# Patient Record
Sex: FEMALE | Race: BLACK OR AFRICAN AMERICAN | Employment: STUDENT | URBAN - METROPOLITAN AREA
[De-identification: names, ages, dates, MRNs, and addresses within clinical notes are randomized per-mention and may not be internally consistent; named-entity substitution may affect disease eponyms.]

---

## 2024-09-19 ENCOUNTER — OFFICE VISIT (OUTPATIENT)
Dept: OBGYN | Age: 29
End: 2024-09-19
Payer: COMMERCIAL

## 2024-09-19 DIAGNOSIS — Z87.59 HISTORY OF MISCARRIAGE: ICD-10-CM

## 2024-09-19 DIAGNOSIS — Z30.09 FAMILY PLANNING COUNSELING: ICD-10-CM

## 2024-09-19 DIAGNOSIS — Z01.419 WOMEN'S ANNUAL ROUTINE GYNECOLOGICAL EXAMINATION: Primary | ICD-10-CM

## 2024-09-19 PROCEDURE — 99395 PREV VISIT EST AGE 18-39: CPT

## 2024-09-19 PROCEDURE — 99459 PELVIC EXAMINATION: CPT

## 2024-09-19 RX ORDER — CALCIUM CARBONATE 300MG(750)
TABLET,CHEWABLE ORAL
Qty: 30 TABLET | Refills: 11 | Status: SHIPPED | OUTPATIENT
Start: 2024-09-19

## 2024-09-19 SDOH — ECONOMIC STABILITY: FOOD INSECURITY: WITHIN THE PAST 12 MONTHS, YOU WORRIED THAT YOUR FOOD WOULD RUN OUT BEFORE YOU GOT MONEY TO BUY MORE.: NEVER TRUE

## 2024-09-19 SDOH — ECONOMIC STABILITY: INCOME INSECURITY: HOW HARD IS IT FOR YOU TO PAY FOR THE VERY BASICS LIKE FOOD, HOUSING, MEDICAL CARE, AND HEATING?: NOT HARD AT ALL

## 2024-09-19 SDOH — ECONOMIC STABILITY: FOOD INSECURITY: WITHIN THE PAST 12 MONTHS, THE FOOD YOU BOUGHT JUST DIDN'T LAST AND YOU DIDN'T HAVE MONEY TO GET MORE.: NEVER TRUE

## 2024-09-19 ASSESSMENT — ENCOUNTER SYMPTOMS
CHEST TIGHTNESS: 0
DIARRHEA: 0
VOMITING: 0
SHORTNESS OF BREATH: 0
NAUSEA: 0
CONSTIPATION: 0
ABDOMINAL PAIN: 0
RESPIRATORY NEGATIVE: 1
GASTROINTESTINAL NEGATIVE: 1

## 2024-09-19 ASSESSMENT — PATIENT HEALTH QUESTIONNAIRE - PHQ9
1. LITTLE INTEREST OR PLEASURE IN DOING THINGS: NOT AT ALL
SUM OF ALL RESPONSES TO PHQ9 QUESTIONS 1 & 2: 0
2. FEELING DOWN, DEPRESSED OR HOPELESS: NOT AT ALL
SUM OF ALL RESPONSES TO PHQ QUESTIONS 1-9: 0

## 2024-10-31 ENCOUNTER — OFFICE VISIT (OUTPATIENT)
Dept: FAMILY MEDICINE CLINIC | Age: 29
End: 2024-10-31

## 2024-10-31 VITALS
DIASTOLIC BLOOD PRESSURE: 82 MMHG | OXYGEN SATURATION: 98 % | SYSTOLIC BLOOD PRESSURE: 122 MMHG | HEART RATE: 90 BPM | HEIGHT: 64 IN | BODY MASS INDEX: 44.39 KG/M2 | WEIGHT: 260 LBS | TEMPERATURE: 97.5 F

## 2024-10-31 DIAGNOSIS — H61.22 HEARING LOSS OF LEFT EAR DUE TO CERUMEN IMPACTION: Primary | ICD-10-CM

## 2024-10-31 NOTE — PROGRESS NOTES
10/31/2024    Marlene MILLER [facility]    Chief Complaint   Patient presents with    Hearing Loss     - diminished hearing left ear, pt states ear feels \"muffled\", denies pain. Pt states she would like both ear looked at. 3 to 4 days.        HPI  History was obtained from patient.  Marlene is a 29 y.o. female who presents today with complaints of diminished hearing from the left ear for the past 3 to 4 days.  She states it sounds muffled.  Similar issues in the past when she had earwax impaction.  She denies any ear pain, or drainage.  No fevers.        PAST MEDICAL HISTORY  Past Medical History:   Diagnosis Date    Dysmenorrhea     History of miscarriage     Irregular menses     Menorrhagia        FAMILY HISTORY  Family History   Problem Relation Age of Onset    Heart Disease Maternal Grandmother        SOCIAL HISTORY  Social History     Socioeconomic History    Marital status: Single     Spouse name: None    Number of children: None    Years of education: None    Highest education level: None   Tobacco Use    Smoking status: Never    Smokeless tobacco: Never   Vaping Use    Vaping status: Never Used   Substance and Sexual Activity    Alcohol use: Yes     Comment: occ    Drug use: Never    Sexual activity: Yes     Partners: Male     Social Determinants of Health     Financial Resource Strain: Low Risk  (9/19/2024)    Overall Financial Resource Strain (CARDIA)     Difficulty of Paying Living Expenses: Not hard at all   Food Insecurity: No Food Insecurity (9/19/2024)    Hunger Vital Sign     Worried About Running Out of Food in the Last Year: Never true     Ran Out of Food in the Last Year: Never true   Transportation Needs: Unknown (9/19/2024)    PRAPARE - Transportation     Lack of Transportation (Non-Medical): No   Housing Stability: Unknown (9/19/2024)    Housing Stability Vital Sign     Homeless in the Last Year: No        SURGICAL HISTORY  Past Surgical History:   Procedure Laterality Date    TONSILLECTOMY

## 2024-11-14 ENCOUNTER — HOSPITAL ENCOUNTER (OUTPATIENT)
Age: 29
Discharge: HOME | End: 2024-11-14
Payer: COMMERCIAL

## 2024-11-14 PROCEDURE — 84144 ASSAY OF PROGESTERONE: CPT

## 2024-11-14 PROCEDURE — 84702 CHORIONIC GONADOTROPIN TEST: CPT

## 2024-11-14 PROCEDURE — 36415 COLL VENOUS BLD VENIPUNCTURE: CPT

## 2024-11-15 LAB
B-HCG SERPL EIA 3RD IS-ACNC: 29.3 MIU/ML
PROGESTERONE LEVEL: 10.2 NG/ML

## 2024-11-16 ENCOUNTER — HOSPITAL ENCOUNTER (OUTPATIENT)
Age: 29
Discharge: HOME | End: 2024-11-16
Payer: COMMERCIAL

## 2024-11-16 LAB — B-HCG SERPL EIA 3RD IS-ACNC: 47 MIU/ML

## 2024-11-16 PROCEDURE — 84702 CHORIONIC GONADOTROPIN TEST: CPT

## 2024-11-16 PROCEDURE — 36415 COLL VENOUS BLD VENIPUNCTURE: CPT

## 2024-12-02 ENCOUNTER — HOSPITAL ENCOUNTER (OUTPATIENT)
Age: 29
Discharge: HOME | End: 2024-12-02
Payer: COMMERCIAL

## 2024-12-02 LAB — B-HCG SERPL EIA 3RD IS-ACNC: NORMAL MIU/ML

## 2024-12-02 PROCEDURE — 36415 COLL VENOUS BLD VENIPUNCTURE: CPT

## 2024-12-02 PROCEDURE — 84702 CHORIONIC GONADOTROPIN TEST: CPT

## 2024-12-31 ENCOUNTER — OFFICE VISIT (OUTPATIENT)
Dept: FAMILY MEDICINE CLINIC | Age: 29
End: 2024-12-31
Payer: COMMERCIAL

## 2024-12-31 VITALS
OXYGEN SATURATION: 96 % | RESPIRATION RATE: 18 BRPM | HEIGHT: 64 IN | WEIGHT: 259 LBS | BODY MASS INDEX: 44.22 KG/M2 | HEART RATE: 88 BPM | DIASTOLIC BLOOD PRESSURE: 70 MMHG | SYSTOLIC BLOOD PRESSURE: 108 MMHG

## 2024-12-31 DIAGNOSIS — Z3A.11 11 WEEKS GESTATION OF PREGNANCY: ICD-10-CM

## 2024-12-31 DIAGNOSIS — J06.9 UPPER RESPIRATORY INFECTION WITH COUGH AND CONGESTION: Primary | ICD-10-CM

## 2024-12-31 PROCEDURE — 99213 OFFICE O/P EST LOW 20 MIN: CPT

## 2024-12-31 RX ORDER — AZITHROMYCIN 250 MG/1
TABLET, FILM COATED ORAL
Qty: 6 TABLET | Refills: 0 | Status: SHIPPED | OUTPATIENT
Start: 2024-12-31 | End: 2025-01-10

## 2024-12-31 RX ORDER — PROGESTERONE 200 MG/1
CAPSULE ORAL
COMMUNITY
Start: 2024-12-19

## 2024-12-31 RX ORDER — PREDNISONE 20 MG/1
20 TABLET ORAL 2 TIMES DAILY WITH MEALS
Qty: 10 TABLET | Refills: 0 | Status: SHIPPED | OUTPATIENT
Start: 2024-12-31 | End: 2025-01-05

## 2024-12-31 ASSESSMENT — ENCOUNTER SYMPTOMS
GASTROINTESTINAL NEGATIVE: 1
SHORTNESS OF BREATH: 1
COUGH: 1

## 2024-12-31 NOTE — PROGRESS NOTES
Wt 117.5 kg (259 lb)   SpO2 96%   BMI 44.46 kg/m²     Physical Exam  Vitals and nursing note reviewed.   Constitutional:       General: She is not in acute distress.     Appearance: She is ill-appearing. She is not toxic-appearing or diaphoretic.   HENT:      Head: Normocephalic and atraumatic.   Cardiovascular:      Rate and Rhythm: Normal rate and regular rhythm.      Heart sounds: Normal heart sounds.   Pulmonary:      Effort: Pulmonary effort is normal.      Breath sounds: Normal air entry. Examination of the right-upper field reveals rales. Examination of the left-upper field reveals rales. Examination of the left-lower field reveals rales. Rales present.   Neurological:      Mental Status: She is alert.         ASSESSMENT & PLAN  1. Upper respiratory infection with cough and congestion  Given symptoms lasting for greater than 10 days and pregnancy will treat with 5 day steroid burst in addition to Azithromycin.   - predniSONE (DELTASONE) 20 MG tablet; Take 1 tablet by mouth with breakfast and with evening meal for 5 days With breakfast and lunch  Dispense: 10 tablet; Refill: 0  - azithromycin (ZITHROMAX) 250 MG tablet; 500mg on day 1 followed by 250mg on days 2 - 5  Dispense: 6 tablet; Refill: 0    2. 11 weeks gestation of pregnancy  Following with OB routinely. Reviewed medication safety for prednisone and azithromycin. Risk of prednisone lower due to short timeline.       Return if symptoms worsen or fail to improve.          Plan of care reviewed with patient as described above, all questions answered at this time. Patient agreeable with plan of care.       Electronically signed by TOOTIE Reagan CNP on 12/31/2024

## 2025-02-07 DIAGNOSIS — R23.3 PETECHIAE: Primary | ICD-10-CM

## 2025-03-08 ENCOUNTER — HOSPITAL ENCOUNTER (OUTPATIENT)
Age: 30
Discharge: HOME | End: 2025-03-08
Payer: COMMERCIAL

## 2025-03-08 DIAGNOSIS — R23.3 PETECHIAE: ICD-10-CM

## 2025-03-08 LAB
ABO + RH BLD: NORMAL
BASOPHILS # BLD: 0.03 K/UL
BASOPHILS NFR BLD: 0 % (ref 0–1)
BLOOD BANK COMMENT: NORMAL
BLOOD BANK SAMPLE EXPIRATION: NORMAL
BLOOD GROUP ANTIBODIES SERPL: NEGATIVE
EOSINOPHIL # BLD: 0.18 K/UL
EOSINOPHILS RELATIVE PERCENT: 2 % (ref 0–3)
ERYTHROCYTE [DISTWIDTH] IN BLOOD BY AUTOMATED COUNT: 15.6 % (ref 11.7–14.9)
ERYTHROCYTE [DISTWIDTH] IN BLOOD BY AUTOMATED COUNT: 15.6 % (ref 11.7–14.9)
HBV CORE AB SER QL: NONREACTIVE
HCT VFR BLD AUTO: 35.8 % (ref 37–47)
HCT VFR BLD AUTO: 36.6 % (ref 37–47)
HGB BLD-MCNC: 11.6 G/DL (ref 12.5–16)
HGB BLD-MCNC: 11.8 G/DL (ref 12.5–16)
HIV 1+2 AB+HIV1 P24 AG SERPL QL IA: NONREACTIVE
IMM GRANULOCYTES # BLD AUTO: 0.15 K/UL
IMM GRANULOCYTES NFR BLD: 2 %
INR PPP: 1
LYMPHOCYTES NFR BLD: 2.26 K/UL
LYMPHOCYTES RELATIVE PERCENT: 24 % (ref 24–44)
MCH RBC QN AUTO: 27.8 PG (ref 27–31)
MCH RBC QN AUTO: 27.9 PG (ref 27–31)
MCHC RBC AUTO-ENTMCNC: 32.2 G/DL (ref 32–36)
MCHC RBC AUTO-ENTMCNC: 32.4 G/DL (ref 32–36)
MCV RBC AUTO: 86.1 FL (ref 78–100)
MCV RBC AUTO: 86.3 FL (ref 78–100)
MONOCYTES NFR BLD: 0.59 K/UL
MONOCYTES NFR BLD: 6 % (ref 0–4)
NEUTROPHILS NFR BLD: 66 % (ref 36–66)
NEUTS SEG NFR BLD: 6.36 K/UL
PARTIAL THROMBOPLASTIN TIME: 26.9 SEC (ref 25.1–37.1)
PLATELET # BLD AUTO: 172 K/UL (ref 140–440)
PLATELET # BLD AUTO: 176 K/UL (ref 140–440)
PMV BLD AUTO: 12 FL (ref 7.5–11.1)
PMV BLD AUTO: 12 FL (ref 7.5–11.1)
PROTHROMBIN TIME: 13 SEC (ref 11.7–14.5)
RBC # BLD AUTO: 4.16 M/UL (ref 4.2–5.4)
RBC # BLD AUTO: 4.24 M/UL (ref 4.2–5.4)
T PALLIDUM AB SER QL IA: NONREACTIVE
WBC OTHER # BLD: 9.6 K/UL (ref 4–10.5)
WBC OTHER # BLD: 9.6 K/UL (ref 4–10.5)

## 2025-03-08 PROCEDURE — 85025 COMPLETE CBC W/AUTO DIFF WBC: CPT

## 2025-03-08 PROCEDURE — 36415 COLL VENOUS BLD VENIPUNCTURE: CPT

## 2025-03-08 PROCEDURE — 87389 HIV-1 AG W/HIV-1&-2 AB AG IA: CPT

## 2025-03-08 PROCEDURE — 86787 VARICELLA-ZOSTER ANTIBODY: CPT

## 2025-03-08 PROCEDURE — 86850 RBC ANTIBODY SCREEN: CPT

## 2025-03-08 PROCEDURE — 86780 TREPONEMA PALLIDUM: CPT

## 2025-03-08 PROCEDURE — 86900 BLOOD TYPING SEROLOGIC ABO: CPT

## 2025-03-08 PROCEDURE — 86704 HEP B CORE ANTIBODY TOTAL: CPT

## 2025-03-08 PROCEDURE — 86803 HEPATITIS C AB TEST: CPT

## 2025-03-08 PROCEDURE — 85610 PROTHROMBIN TIME: CPT

## 2025-03-08 PROCEDURE — 85730 THROMBOPLASTIN TIME PARTIAL: CPT

## 2025-03-08 PROCEDURE — 85027 COMPLETE CBC AUTOMATED: CPT

## 2025-03-08 PROCEDURE — 86901 BLOOD TYPING SEROLOGIC RH(D): CPT

## 2025-03-08 PROCEDURE — 86762 RUBELLA ANTIBODY: CPT

## 2025-03-10 ENCOUNTER — RESULTS FOLLOW-UP (OUTPATIENT)
Dept: FAMILY MEDICINE CLINIC | Age: 30
End: 2025-03-10

## 2025-03-12 LAB
RUBELLA AB (IGG): 409 IU/ML
VARICELLA-ZOSTER (IMMUNE STATUS) IGG: NORMAL

## 2025-04-19 ENCOUNTER — HOSPITAL ENCOUNTER (OUTPATIENT)
Age: 30
Discharge: HOME | End: 2025-04-19
Payer: COMMERCIAL

## 2025-04-19 LAB
AMOUNT GLUCOSE GIVEN: NORMAL G
ERYTHROCYTE [DISTWIDTH] IN BLOOD BY AUTOMATED COUNT: 16 % (ref 11.7–14.9)
GLUCOSE 3H P 100 G GLC PO SERPL-MCNC: 149 MG/DL
HCT VFR BLD AUTO: 35.7 % (ref 37–47)
HGB BLD-MCNC: 11.5 G/DL (ref 12.5–16)
MCH RBC QN AUTO: 28.3 PG (ref 27–31)
MCHC RBC AUTO-ENTMCNC: 32.2 G/DL (ref 32–36)
MCV RBC AUTO: 87.9 FL (ref 78–100)
PLATELET # BLD AUTO: 164 K/UL (ref 140–440)
PMV BLD AUTO: 11.8 FL (ref 7.5–11.1)
RBC # BLD AUTO: 4.06 M/UL (ref 4.2–5.4)
WBC OTHER # BLD: 7.5 K/UL (ref 4–10.5)

## 2025-04-19 PROCEDURE — 82950 GLUCOSE TEST: CPT

## 2025-04-19 PROCEDURE — 36415 COLL VENOUS BLD VENIPUNCTURE: CPT

## 2025-04-19 PROCEDURE — 85027 COMPLETE CBC AUTOMATED: CPT

## 2025-04-19 PROCEDURE — 87517 HEPATITIS B DNA QUANT: CPT

## 2025-04-22 LAB
HBV IU/ML: NOT DETECTED IU/ML
HBV LOG 10 IU/ML: NOT DETECTED LOG IU/ML
INTERPRETATION: NOT DETECTED

## 2025-05-02 ENCOUNTER — HOSPITAL ENCOUNTER (OUTPATIENT)
Age: 30
Discharge: HOME | End: 2025-05-02
Payer: COMMERCIAL

## 2025-05-02 LAB
AMOUNT GLUCOSE GIVEN: NORMAL G
GLUCOSE 2H P 100 G GLC PO SERPL-MCNC: 90 MG/DL (ref 70–99)
GLUCOSE 3H P 100 G GLC PO SERPL-MCNC: 104 MG/DL
GLUCOSE TOLERANCE TEST 2 HOUR: 138 MG/DL
GLUCOSE TOLERANCE TEST 3 HOUR: 154 MG/DL

## 2025-05-02 PROCEDURE — 82951 GLUCOSE TOLERANCE TEST (GTT): CPT

## 2025-05-02 PROCEDURE — 36415 COLL VENOUS BLD VENIPUNCTURE: CPT

## 2025-05-02 PROCEDURE — 82952 GTT-ADDED SAMPLES: CPT

## 2025-06-03 ENCOUNTER — INITIAL PRENATAL (OUTPATIENT)
Dept: OBGYN | Age: 30
End: 2025-06-03

## 2025-06-03 VITALS
WEIGHT: 266 LBS | DIASTOLIC BLOOD PRESSURE: 69 MMHG | SYSTOLIC BLOOD PRESSURE: 116 MMHG | BODY MASS INDEX: 45.66 KG/M2 | HEART RATE: 109 BPM

## 2025-06-03 DIAGNOSIS — E66.01 MORBID OBESITY (HCC): ICD-10-CM

## 2025-06-03 DIAGNOSIS — O09.93 SUPERVISION OF HIGH RISK PREGNANCY IN THIRD TRIMESTER: Primary | ICD-10-CM

## 2025-06-03 DIAGNOSIS — Z3A.32 32 WEEKS GESTATION OF PREGNANCY: ICD-10-CM

## 2025-06-03 PROCEDURE — 0502F SUBSEQUENT PRENATAL CARE: CPT | Performed by: NURSE PRACTITIONER

## 2025-06-03 RX ORDER — ASPIRIN 81 MG/1
81 TABLET ORAL DAILY
COMMUNITY

## 2025-06-03 SDOH — ECONOMIC STABILITY: FOOD INSECURITY: WITHIN THE PAST 12 MONTHS, THE FOOD YOU BOUGHT JUST DIDN'T LAST AND YOU DIDN'T HAVE MONEY TO GET MORE.: NEVER TRUE

## 2025-06-03 SDOH — ECONOMIC STABILITY: FOOD INSECURITY: WITHIN THE PAST 12 MONTHS, YOU WORRIED THAT YOUR FOOD WOULD RUN OUT BEFORE YOU GOT MONEY TO BUY MORE.: NEVER TRUE

## 2025-06-03 ASSESSMENT — PATIENT HEALTH QUESTIONNAIRE - PHQ9
1. LITTLE INTEREST OR PLEASURE IN DOING THINGS: NOT AT ALL
2. FEELING DOWN, DEPRESSED OR HOPELESS: NOT AT ALL
SUM OF ALL RESPONSES TO PHQ QUESTIONS 1-9: 0

## 2025-06-03 NOTE — PROGRESS NOTES
Return OB Office Visit    CC:   Chief Complaint   Patient presents with    Initial Prenatal Visit     +FM, taking pnv/asa 81, OB transfer from [facility] in [place], went to [facility] for echo, anatomy and growth scan, 3 hour gtt completed .        HPI:  Patient seen and examined. No concerns/complaints. Denies VB, LOF, ctx. +Fm.  Denies headaches, vision changes, RUQ pain, increased LE edema.  Denies chest pain, shortness of breath, fever, chills, nausea, vomiting.      Review of Systems: The following ROS was otherwise negative, except as noted in the HPI: constitutional, HEENT, respiratory, cardiovascular, gastrointestinal, genitourinary, skin, musculoskeletal, neurological, psych    Objective:  /69   Pulse (!) 109   Wt 120.7 kg (266 lb)   LMP 10/16/2024   BMI 45.66 kg/m²     Physical Exam  Vitals and nursing note reviewed.   Constitutional:       Appearance: Normal appearance. She is obese.   HENT:      Head: Normocephalic.   Pulmonary:      Effort: Pulmonary effort is normal.   Musculoskeletal:         General: Normal range of motion.      Cervical back: Normal range of motion.   Skin:     General: Skin is warm and dry.   Neurological:      Mental Status: She is alert.   Psychiatric:         Mood and Affect: Mood normal.         Gravid, non tender, no flank pain    FHR: + by doppler    Assessment/Plan:   Marlene Antunez is a 29 y.o.  at 32w6d who presents for routine OB visit     Diagnosis Orders   1. Supervision of high risk pregnancy in third trimester   -All labs up to date and reviewed. PTL s/s and FM patterns reviewed. Report bldg/lof  -Continue PNV QD Culture, Urine    C.trachomatis N.gonorrhoeae DNA, Urine      2. 32 weeks gestation of pregnancy  Culture, Urine    C.trachomatis N.gonorrhoeae DNA, Urine      3. Morbid obesity (HCC)   -Growth with BPP next week   -wkly BPP until delivery  -growth Q 4 weeks         Normal fetal echo 25        Return in about 2 weeks (around

## 2025-06-04 LAB
C TRACH DNA UR QL NAA+PROBE: NEGATIVE
N GONORRHOEA DNA UR QL NAA+PROBE: NEGATIVE

## 2025-06-05 LAB — BACTERIA UR CULT: NORMAL

## 2025-06-17 ENCOUNTER — ROUTINE PRENATAL (OUTPATIENT)
Dept: OBGYN | Age: 30
End: 2025-06-17

## 2025-06-17 VITALS
WEIGHT: 268 LBS | HEART RATE: 92 BPM | BODY MASS INDEX: 46 KG/M2 | SYSTOLIC BLOOD PRESSURE: 123 MMHG | DIASTOLIC BLOOD PRESSURE: 81 MMHG

## 2025-06-17 DIAGNOSIS — Z3A.34 34 WEEKS GESTATION OF PREGNANCY: ICD-10-CM

## 2025-06-17 DIAGNOSIS — E66.01 MORBID OBESITY (HCC): ICD-10-CM

## 2025-06-17 DIAGNOSIS — O09.93 SUPERVISION OF HIGH RISK PREGNANCY IN THIRD TRIMESTER: Primary | ICD-10-CM

## 2025-06-17 DIAGNOSIS — O36.63X0 EXCESSIVE FETAL GROWTH AFFECTING MANAGEMENT OF PREGNANCY IN THIRD TRIMESTER, SINGLE OR UNSPECIFIED FETUS: ICD-10-CM

## 2025-06-17 PROBLEM — Z30.41 ORAL CONTRACEPTIVE USE: Status: RESOLVED | Noted: 2023-12-05 | Resolved: 2025-06-17

## 2025-06-17 PROCEDURE — 0502F SUBSEQUENT PRENATAL CARE: CPT | Performed by: STUDENT IN AN ORGANIZED HEALTH CARE EDUCATION/TRAINING PROGRAM

## 2025-06-17 NOTE — PROGRESS NOTES
Department of Obstetrics and Gynecology  Routine Prenatal Office Visit  Name:  Marlene Antunez   CSN: [id number]    : 1995   Age: 29 y.o.        Chief Complaint   Patient presents with    Routine Prenatal Visit     +fm, taking pnv  Wishing to discuss pnv  U/s today       EDC: Estimated Date of Delivery: 25     Marlene Antunez is a 29 y.o.  at 34w6d     Subjective : Patient doing well without complaints.    + Fetal movement.     Negative headache, negative vision changes, negative right upper quadrant pain, negative edema, positive fetal movement, negative contractions, negative vaginal bleeding, negative leakage of fluid. Pt denies nausea/vomiting and calf pain.    Objective :    /81   Pulse 92   Wt 121.6 kg (268 lb)   LMP 10/16/2024   BMI 46.00 kg/m²         Physical Exam:  General Appearance: Alert and oriented. No acute distress  Gastrointestinal: Soft. Non-tender, non-distended. Gravid uterus.  Musculoskeletal: No significant lower extremity edema.    Fundal Height: 37 cm  Fetal Heart Tones: 145 bpm    ASSESSMENT/PLAN:     1. Marlene Antunez is a 29 y.o.  at 34w6d     Diagnosis Orders   1. Supervision of high risk pregnancy in third trimester        2. 34 weeks gestation of pregnancy        3. Morbid obesity (HCC)        4. Excessive fetal growth affecting management of pregnancy in third trimester, single or unspecified fetus             All OB labs and imaging reviewed  Recommend continuing and taking daily prenatal vitamin  Tylenol for episodic pain relief as needed  Failed 1 hr, passed 3 hr  GBS at next visit  Transfer at 30w from [facility]  Continue weekly testing  US  with EFW 82.4%, AC 93.7%. Did discuss excessive fetal growth and risk of shoulder dystocia. Also discussed risks of PLTCS.     Return to the office in 2 weeks    Electronically signed by: Hilary Dominguez DO 2025

## 2025-06-18 DIAGNOSIS — O09.93 SUPERVISION OF HIGH RISK PREGNANCY IN THIRD TRIMESTER: Primary | ICD-10-CM

## 2025-06-18 DIAGNOSIS — E66.01 SEVERE OBESITY DUE TO EXCESS CALORIES AFFECTING PREGNANCY IN THIRD TRIMESTER (HCC): ICD-10-CM

## 2025-06-18 DIAGNOSIS — O99.213 SEVERE OBESITY DUE TO EXCESS CALORIES AFFECTING PREGNANCY IN THIRD TRIMESTER (HCC): ICD-10-CM

## 2025-06-30 ENCOUNTER — ROUTINE PRENATAL (OUTPATIENT)
Dept: OBGYN | Age: 30
End: 2025-06-30

## 2025-06-30 ENCOUNTER — TELEPHONE (OUTPATIENT)
Dept: OBGYN | Age: 30
End: 2025-06-30

## 2025-06-30 VITALS
WEIGHT: 268.4 LBS | HEART RATE: 116 BPM | DIASTOLIC BLOOD PRESSURE: 88 MMHG | BODY MASS INDEX: 46.07 KG/M2 | SYSTOLIC BLOOD PRESSURE: 134 MMHG

## 2025-06-30 DIAGNOSIS — E66.01 SEVERE OBESITY DUE TO EXCESS CALORIES AFFECTING PREGNANCY IN THIRD TRIMESTER (HCC): ICD-10-CM

## 2025-06-30 DIAGNOSIS — O09.93 SUPERVISION OF HIGH RISK PREGNANCY IN THIRD TRIMESTER: Primary | ICD-10-CM

## 2025-06-30 DIAGNOSIS — Z3A.36 36 WEEKS GESTATION OF PREGNANCY: ICD-10-CM

## 2025-06-30 DIAGNOSIS — O99.213 SEVERE OBESITY DUE TO EXCESS CALORIES AFFECTING PREGNANCY IN THIRD TRIMESTER (HCC): ICD-10-CM

## 2025-06-30 PROCEDURE — 0502F SUBSEQUENT PRENATAL CARE: CPT | Performed by: OBSTETRICS & GYNECOLOGY

## 2025-06-30 NOTE — PROGRESS NOTES
Return OB Office Visit    CC:   Chief Complaint   Patient presents with    Routine Prenatal Visit     +fm, taking pnv. Pt c/o fluid retention, hip pain and trouble sleeping. Pt requesting to start disability on 2025. Pt also wishing to have GBS done at next appt w/ female physician.        HPI:  Patient seen and examined. No concerns/complaints. Denies VB, LOF, ctx. +Fm.  Denies headaches, vision changes, RUQ pain, increased LE edema.  Denies chest pain, shortness of breath, fever, chills, nausea, vomiting.      Review of Systems: The following ROS was otherwise negative, except as noted in the HPI: constitutional, HEENT, respiratory, cardiovascular, gastrointestinal, genitourinary, skin, musculoskeletal, neurological, psych    Objective:  /88   Pulse (!) 116   Wt 121.7 kg (268 lb 6.4 oz)   LMP 10/16/2024   BMI 46.07 kg/m²     Physical Exam    Gravid, non tender, no flank pain    Fundal Height:  normal    FHR: + by doppler    Cervix: cervical exam not performed today    Assessment/Plan:   Marlene Antunez is a 29 y.o.  at 36w5d who presents for routine OB visit     Diagnosis Orders   1. Supervision of high risk pregnancy in third trimester        2. Severe obesity due to excess calories affecting pregnancy in third trimester (HCC)        3. 36 weeks gestation of pregnancy            Data Unavailable     Chaperone Brittany Adams was present for the entire appointment.      All up-to-date obstetric labwork and imaging reviewed for today's encounter.     Patient was instructed to watch for vaginal bleeding or loss of fluid.  She will call with increasing contractions.  Fetal kick counts were discussed.  She will maintain her prenatal vitamin every day.  Any questions were answered.    Disposition:  none    No follow-ups on file.    Rufsu Finley MD

## 2025-06-30 NOTE — TELEPHONE ENCOUNTER
Pt came to you today for a visit and was supposed to discuss starting maternity leave on 7/16/25, due date 7/23/25.  She said she discussed it with you.  If there is a medical reason to be off early, please document it-employer will ask for office notes.

## 2025-07-08 ENCOUNTER — ROUTINE PRENATAL (OUTPATIENT)
Dept: OBGYN | Age: 30
End: 2025-07-08

## 2025-07-08 VITALS
SYSTOLIC BLOOD PRESSURE: 133 MMHG | DIASTOLIC BLOOD PRESSURE: 83 MMHG | WEIGHT: 269 LBS | HEART RATE: 105 BPM | BODY MASS INDEX: 46.17 KG/M2

## 2025-07-08 DIAGNOSIS — Z3A.37 37 WEEKS GESTATION OF PREGNANCY: ICD-10-CM

## 2025-07-08 DIAGNOSIS — E66.01 SEVERE OBESITY DUE TO EXCESS CALORIES AFFECTING PREGNANCY IN THIRD TRIMESTER (HCC): ICD-10-CM

## 2025-07-08 DIAGNOSIS — O99.213 SEVERE OBESITY DUE TO EXCESS CALORIES AFFECTING PREGNANCY IN THIRD TRIMESTER (HCC): ICD-10-CM

## 2025-07-08 DIAGNOSIS — Z34.83 PRENATAL CARE, SUBSEQUENT PREGNANCY, THIRD TRIMESTER: ICD-10-CM

## 2025-07-08 DIAGNOSIS — O09.93 SUPERVISION OF HIGH RISK PREGNANCY IN THIRD TRIMESTER: Primary | ICD-10-CM

## 2025-07-08 PROCEDURE — 0502F SUBSEQUENT PRENATAL CARE: CPT | Performed by: NURSE PRACTITIONER

## 2025-07-08 NOTE — PROGRESS NOTES
Return OB Office Visit    CC:   Chief Complaint   Patient presents with    Routine Prenatal Visit     U/s today. +fm, taking pnv. Pt voiced concerns of fetus size. Wishing to discuss.        HPI:  Patient seen and examined. No concerns/complaints. Denies VB, LOF, ctx. +Fm.  Denies headaches, vision changes, RUQ pain, increased LE edema.  Denies chest pain, shortness of breath, fever, chills, nausea, vomiting.      Review of Systems: The following ROS was otherwise negative, except as noted in the HPI: constitutional, HEENT, respiratory, cardiovascular, gastrointestinal, genitourinary, skin, musculoskeletal, neurological, psych    Objective:  LMP 10/16/2024     Physical Exam  Vitals and nursing note reviewed.   Constitutional:       Appearance: Normal appearance. She is obese.   HENT:      Head: Normocephalic.   Pulmonary:      Effort: Pulmonary effort is normal.   Musculoskeletal:         General: Normal range of motion.      Cervical back: Normal range of motion.   Skin:     General: Skin is warm and dry.   Neurological:      Mental Status: She is alert.   Psychiatric:         Mood and Affect: Mood normal.         Gravid, non tender, no flank pain    FHR: + by doppler    Assessment/Plan:   Marlene Antunez is a 29 y.o.  at 37w6d who presents for routine OB visit     Diagnosis Orders   1. Supervision of high risk pregnancy in third trimester All labs up to date and reviewed. PTL s/s and FM patterns reviewed. Report bldg/lof  -Continue PNV QD  -GBS collected         2. 37 weeks gestation of pregnancy  Culture, Strep B Screen, Vaginal/Rectal      3. Prenatal care, subsequent pregnancy, third trimester  Culture, Strep B Screen, Vaginal/Rectal      4. Severe obesity due to excess calories affecting pregnancy in third trimester (HCC)   -u/s reviewed; S=D Large AC. BPP                Return in about 1 week (around 7/15/2025).    Jennifer Vaughan, TOOTIE - CNP

## 2025-07-09 ENCOUNTER — HOSPITAL ENCOUNTER (OUTPATIENT)
Age: 30
Discharge: HOME | End: 2025-07-09
Attending: OBSTETRICS & GYNECOLOGY | Admitting: OBSTETRICS & GYNECOLOGY
Payer: COMMERCIAL

## 2025-07-09 ENCOUNTER — APPOINTMENT (OUTPATIENT)
Dept: ULTRASOUND IMAGING | Age: 30
End: 2025-07-09
Payer: COMMERCIAL

## 2025-07-09 VITALS
RESPIRATION RATE: 18 BRPM | SYSTOLIC BLOOD PRESSURE: 115 MMHG | DIASTOLIC BLOOD PRESSURE: 69 MMHG | OXYGEN SATURATION: 98 % | TEMPERATURE: 98 F | HEART RATE: 105 BPM

## 2025-07-09 PROBLEM — O36.8190 DECREASED FETAL MOVEMENT AFFECTING MANAGEMENT OF MOTHER, ANTEPARTUM: Status: ACTIVE | Noted: 2025-07-09

## 2025-07-09 PROCEDURE — 59025 FETAL NON-STRESS TEST: CPT

## 2025-07-09 PROCEDURE — 76819 FETAL BIOPHYS PROFIL W/O NST: CPT

## 2025-07-09 NOTE — FLOWSHEET NOTE
Pt present amb to unit for c/o decreased FM. States is feeling slight movements. Abd soft and non tender. FM audible. Denies vaginal bleeding or leaking fluid. POC discussed. [facility] notified of pt arrival.

## 2025-07-09 NOTE — DISCHARGE INSTRUCTIONS
Counting Your Baby's Kicks: Care Instructions  Overview     Counting your baby's kicks is one way your doctor can tell that your baby is healthy. You will probably feel your baby move for the first time between 16 and 22 weeks. The movement may feel like flutters rather than kicks. Your baby may move more at certain times of the day. When you are active, you may notice less kicking than when you are resting. At your prenatal visits, your doctor will ask whether the baby is active.  In your last trimester, your doctor may ask you to count the number of times you feel your baby move.  Follow-up care is a key part of your treatment and safety. Be sure to make and go to all appointments, and call your doctor if you are having problems. It's also a good idea to know your test results and keep a list of the medicines you take.  How do you count fetal kicks?  A common method of checking your baby's movement is to note the length of time it takes to count 10 movements (such as kicks, flutters, or rolls).  Pick your baby's most active time of day to count. This may be any time from morning to evening.  If you don't feel 10 movements in an hour, have something to eat or drink and count for another hour. If you don't feel at least 10 movements in the 2-hour period, call your doctor.  Do not use an at-home Doppler heart monitor in place of counting fetal movements.  When should you call for help?   Call your doctor now or seek immediate medical care if:    You feel fewer than 10 movements in a 2-hour period.     You noticed that your baby has stopped moving or is moving less than normal.   Watch closely for changes in your health, and be sure to contact your doctor if you have any problems.  Where can you learn more?  Go to https://www.healthwise.net/patientEd and enter U048 to learn more about \"Counting Your Baby's Kicks: Care Instructions.\"  Current as of: April 30, 2024  Content Version: 14.5  © [id number] [employer]

## 2025-07-09 NOTE — PROGRESS NOTES
Department of Obstetrics and Gynecology  Labor and Delivery  TRIAGE NOTE      SUBJECTIVE:    Chief Complaint   Patient presents with    Decreased Fetal Movement     Marlene is a 30 yo  at 38 weeks. She presented to triage with concern for decreased fetal movement. She reports she has felt movement since arrival and overall would report feeling >10 movements in 2 hours. She denies vb, lof, ctxs.     OBJECTIVE    Vitals:  /69   Pulse (!) 105   Temp 98 °F (36.7 °C) (Temporal)   Resp 18   LMP 10/16/2024   SpO2 98%       CONSTITUTIONAL:  negative  RESPIRATORY:  negative  CARDIOVASCULAR:  negative  GASTROINTESTINAL:  negative  ALLERGIC/IMMUNOLOGIC:  negative  NEUROLOGICAL:  negative  BEHAVIOR/PSYCH:  negative    Cervix:  deferred    Fetal Position:    Cephalic    Membranes:    Intact    Fetal heart rate:        Baseline FHR :    135 bpm              Fetal Accelerations:  present        Fetal Decelerations:  questionable late at 1505        Fetal Variability:   moderate    Contraction frequency:  Irregular     DATA:  US FETAL BIOPHYSICAL PROFILE WO NON STRESS TESTING, 2025 16:29.     ULTRASOUND FETAL BIOPHYSICAL PROFILE     PATIENT'S AGE AND GENDER: 29 years, Female.     FINDINGS/TECHNIQUE: Biophysical profile ultrasound.     Fetal number: 1.     Fetal position: Cephalic. The placenta is anterior.     Average fetal heart rate: 141 beats per minute.     Amniotic fluid volume: Normal.     BENOIT: 9.23 cm.     Fetal breathin.     Fetal movement: 2.     Fetal tone: 2.     Fluid volume: 2.     Total biophysical profile score 8.     IMPRESSION:  Normal ultrasound fetal biophysical profile with a score of 8.    ASSESSMENT:       30 yo  at 38 weeks  Cat I FHR tracing immediately prior to BPP; Questionable late deceleration at 1505  BPP 8/8  Decreased fetal movement; feeling movement now     PLAN:    Will discharge home for outpatient f/u.     Pt knows to return to unit for any further concerns regarding

## 2025-07-09 NOTE — FLOWSHEET NOTE
Discharge instructions given and explained. Instructed to home and follow up as scheduled and return for decreased FM, leaking fluid, vaginal bleeding or contractions. Information given on fetal kick counts. Pt verbalized understanding and left amb form unit for home without distress.

## 2025-07-10 ENCOUNTER — PATIENT MESSAGE (OUTPATIENT)
Dept: FAMILY MEDICINE CLINIC | Age: 30
End: 2025-07-10

## 2025-07-11 ENCOUNTER — TELEPHONE (OUTPATIENT)
Age: 30
End: 2025-07-11

## 2025-07-12 LAB — GP B STREP SPEC QL CULT: NORMAL

## 2025-07-15 ENCOUNTER — ROUTINE PRENATAL (OUTPATIENT)
Dept: OBGYN | Age: 30
End: 2025-07-15

## 2025-07-15 VITALS
DIASTOLIC BLOOD PRESSURE: 86 MMHG | BODY MASS INDEX: 47.52 KG/M2 | SYSTOLIC BLOOD PRESSURE: 131 MMHG | WEIGHT: 277 LBS | HEART RATE: 97 BPM

## 2025-07-15 DIAGNOSIS — Z3A.38 38 WEEKS GESTATION OF PREGNANCY: ICD-10-CM

## 2025-07-15 DIAGNOSIS — O99.213 SEVERE OBESITY DUE TO EXCESS CALORIES AFFECTING PREGNANCY IN THIRD TRIMESTER (HCC): ICD-10-CM

## 2025-07-15 DIAGNOSIS — O09.93 SUPERVISION OF HIGH RISK PREGNANCY IN THIRD TRIMESTER: Primary | ICD-10-CM

## 2025-07-15 DIAGNOSIS — E66.01 SEVERE OBESITY DUE TO EXCESS CALORIES AFFECTING PREGNANCY IN THIRD TRIMESTER (HCC): ICD-10-CM

## 2025-07-15 PROCEDURE — 0502F SUBSEQUENT PRENATAL CARE: CPT | Performed by: OBSTETRICS & GYNECOLOGY

## 2025-07-15 NOTE — PROGRESS NOTES
Return OB Office Visit    CC:   Chief Complaint   Patient presents with    Routine Prenatal Visit     Pt requesting to discuss induction. +fm. U/s today.  Pt declined exam today.       HPI:  Patient seen and examined. No concerns/complaints. Denies VB, LOF, ctx. +Fm.  Denies headaches, vision changes, RUQ pain, increased LE edema.  Denies chest pain, shortness of breath, fever, chills, nausea, vomiting.      Review of Systems: The following ROS was otherwise negative, except as noted in the HPI: constitutional, HEENT, respiratory, cardiovascular, gastrointestinal, genitourinary, skin, musculoskeletal, neurological, psych    Objective:  /86   Pulse 97   Wt 125.6 kg (277 lb)   LMP 10/16/2024   BMI 47.52 kg/m²     Gravid, non tender, no flank pain    FHR: +by doppler    Assessment/Plan:   Marlene Antunez is a 29 y.o.  at 38w6d who presents for routine OB visit     Diagnosis Orders   1. Supervision of high risk pregnancy in third trimester        2. Severe obesity due to excess calories affecting pregnancy in third trimester (HCC)        3. 38 weeks gestation of pregnancy            Return in about 1 week (around 2025).    All OB labs and imaging reviewed  Vitamins for the duration of pregnancy  Fkcs  Labor precautions  Declines risk reducing induction    Paul Monge MD

## 2025-07-18 ENCOUNTER — ANESTHESIA (OUTPATIENT)
Dept: LABOR AND DELIVERY | Age: 30
End: 2025-07-18
Payer: COMMERCIAL

## 2025-07-18 ENCOUNTER — HOSPITAL ENCOUNTER (INPATIENT)
Age: 30
LOS: 2 days | Discharge: HOME | End: 2025-07-20
Attending: STUDENT IN AN ORGANIZED HEALTH CARE EDUCATION/TRAINING PROGRAM | Admitting: STUDENT IN AN ORGANIZED HEALTH CARE EDUCATION/TRAINING PROGRAM
Payer: COMMERCIAL

## 2025-07-18 ENCOUNTER — ANESTHESIA EVENT (OUTPATIENT)
Dept: LABOR AND DELIVERY | Age: 30
End: 2025-07-18
Payer: COMMERCIAL

## 2025-07-18 PROBLEM — Z3A.39 39 WEEKS GESTATION OF PREGNANCY: Status: ACTIVE | Noted: 2025-07-18

## 2025-07-18 PROBLEM — O99.210 OBESITY IN PREGNANCY: Status: ACTIVE | Noted: 2025-07-18

## 2025-07-18 PROBLEM — O36.63X0 EXCESSIVE FETAL GROWTH AFFECTING MANAGEMENT OF PREGNANCY IN THIRD TRIMESTER: Status: ACTIVE | Noted: 2025-07-18

## 2025-07-18 PROBLEM — O13.9 GESTATIONAL HYPERTENSION: Status: ACTIVE | Noted: 2025-07-18

## 2025-07-18 LAB
ABO + RH BLD: NORMAL
BLOOD BANK SAMPLE EXPIRATION: NORMAL
BLOOD GROUP ANTIBODIES SERPL: NEGATIVE
CREAT UR-MCNC: 81.7 MG/DL (ref 28–217)
ERYTHROCYTE [DISTWIDTH] IN BLOOD BY AUTOMATED COUNT: 15.6 % (ref 11.7–14.9)
HCT VFR BLD AUTO: 39.7 % (ref 37–47)
HGB BLD-MCNC: 13.2 G/DL (ref 12.5–16)
MCH RBC QN AUTO: 28.6 PG (ref 27–31)
MCHC RBC AUTO-ENTMCNC: 33.2 G/DL (ref 32–36)
MCV RBC AUTO: 85.9 FL (ref 78–100)
PLATELET, FLUORESCENCE: 132 K/UL (ref 140–440)
PMV BLD AUTO: 12.5 FL (ref 7.5–11.1)
RBC # BLD AUTO: 4.62 M/UL (ref 4.2–5.4)
TOTAL PROTEIN, URINE: 12 MG/DL
URINE TOTAL PROTEIN CREATININE RATIO: 0.14 (ref 0–0.2)
WBC OTHER # BLD: 8.2 K/UL (ref 4–10.5)

## 2025-07-18 PROCEDURE — 6360000002 HC RX W HCPCS: Performed by: NURSE ANESTHETIST, CERTIFIED REGISTERED

## 2025-07-18 PROCEDURE — 6360000002 HC RX W HCPCS: Performed by: STUDENT IN AN ORGANIZED HEALTH CARE EDUCATION/TRAINING PROGRAM

## 2025-07-18 PROCEDURE — 84156 ASSAY OF PROTEIN URINE: CPT

## 2025-07-18 PROCEDURE — 59510 CESAREAN DELIVERY: CPT | Performed by: STUDENT IN AN ORGANIZED HEALTH CARE EDUCATION/TRAINING PROGRAM

## 2025-07-18 PROCEDURE — 86900 BLOOD TYPING SEROLOGIC ABO: CPT

## 2025-07-18 PROCEDURE — 1220000000 HC SEMI PRIVATE OB R&B

## 2025-07-18 PROCEDURE — 86850 RBC ANTIBODY SCREEN: CPT

## 2025-07-18 PROCEDURE — 2500000003 HC RX 250 WO HCPCS: Performed by: NURSE ANESTHETIST, CERTIFIED REGISTERED

## 2025-07-18 PROCEDURE — 94761 N-INVAS EAR/PLS OXIMETRY MLT: CPT

## 2025-07-18 PROCEDURE — 2709999900 HC NON-CHARGEABLE SUPPLY: Performed by: STUDENT IN AN ORGANIZED HEALTH CARE EDUCATION/TRAINING PROGRAM

## 2025-07-18 PROCEDURE — 86901 BLOOD TYPING SEROLOGIC RH(D): CPT

## 2025-07-18 PROCEDURE — 3609079900 HC CESAREAN SECTION: Performed by: STUDENT IN AN ORGANIZED HEALTH CARE EDUCATION/TRAINING PROGRAM

## 2025-07-18 PROCEDURE — 85027 COMPLETE CBC AUTOMATED: CPT

## 2025-07-18 PROCEDURE — 7100000000 HC PACU RECOVERY - FIRST 15 MIN: Performed by: STUDENT IN AN ORGANIZED HEALTH CARE EDUCATION/TRAINING PROGRAM

## 2025-07-18 PROCEDURE — 2580000003 HC RX 258: Performed by: STUDENT IN AN ORGANIZED HEALTH CARE EDUCATION/TRAINING PROGRAM

## 2025-07-18 PROCEDURE — 6370000000 HC RX 637 (ALT 250 FOR IP): Performed by: STUDENT IN AN ORGANIZED HEALTH CARE EDUCATION/TRAINING PROGRAM

## 2025-07-18 PROCEDURE — 82570 ASSAY OF URINE CREATININE: CPT

## 2025-07-18 PROCEDURE — 10907ZC DRAINAGE OF AMNIOTIC FLUID, THERAPEUTIC FROM PRODUCTS OF CONCEPTION, VIA NATURAL OR ARTIFICIAL OPENING: ICD-10-PCS | Performed by: STUDENT IN AN ORGANIZED HEALTH CARE EDUCATION/TRAINING PROGRAM

## 2025-07-18 PROCEDURE — 3700000025 EPIDURAL BLOCK: Performed by: ANESTHESIOLOGY

## 2025-07-18 PROCEDURE — 51702 INSERT TEMP BLADDER CATH: CPT

## 2025-07-18 RX ORDER — SODIUM CHLORIDE, SODIUM LACTATE, POTASSIUM CHLORIDE, AND CALCIUM CHLORIDE .6; .31; .03; .02 G/100ML; G/100ML; G/100ML; G/100ML
1000 INJECTION, SOLUTION INTRAVENOUS PRN
Status: DISCONTINUED | OUTPATIENT
Start: 2025-07-18 | End: 2025-07-19

## 2025-07-18 RX ORDER — FAMOTIDINE 10 MG/ML
20 INJECTION, SOLUTION INTRAVENOUS 2 TIMES DAILY PRN
Status: DISCONTINUED | OUTPATIENT
Start: 2025-07-18 | End: 2025-07-18 | Stop reason: HOSPADM

## 2025-07-18 RX ORDER — SODIUM CHLORIDE 0.9 % (FLUSH) 0.9 %
5-40 SYRINGE (ML) INJECTION PRN
Status: DISCONTINUED | OUTPATIENT
Start: 2025-07-18 | End: 2025-07-19

## 2025-07-18 RX ORDER — SODIUM CHLORIDE 0.9 % (FLUSH) 0.9 %
5-40 SYRINGE (ML) INJECTION EVERY 12 HOURS SCHEDULED
Status: DISCONTINUED | OUTPATIENT
Start: 2025-07-18 | End: 2025-07-19

## 2025-07-18 RX ORDER — CEFAZOLIN 3 G/1
INJECTION, POWDER, FOR SOLUTION INTRAVENOUS
Status: DISCONTINUED | OUTPATIENT
Start: 2025-07-18 | End: 2025-07-18 | Stop reason: SDUPTHER

## 2025-07-18 RX ORDER — DIPHENHYDRAMINE HYDROCHLORIDE 50 MG/ML
25 INJECTION, SOLUTION INTRAMUSCULAR; INTRAVENOUS EVERY 6 HOURS PRN
Status: DISCONTINUED | OUTPATIENT
Start: 2025-07-18 | End: 2025-07-19

## 2025-07-18 RX ORDER — SODIUM CHLORIDE 9 MG/ML
INJECTION, SOLUTION INTRAVENOUS
Status: DISCONTINUED
Start: 2025-07-18 | End: 2025-07-18 | Stop reason: WASHOUT

## 2025-07-18 RX ORDER — TRANEXAMIC ACID 10 MG/ML
1000 INJECTION, SOLUTION INTRAVENOUS
Status: DISCONTINUED | OUTPATIENT
Start: 2025-07-18 | End: 2025-07-19

## 2025-07-18 RX ORDER — SODIUM CHLORIDE 9 MG/ML
INJECTION, SOLUTION INTRAVENOUS PRN
Status: DISCONTINUED | OUTPATIENT
Start: 2025-07-18 | End: 2025-07-19

## 2025-07-18 RX ORDER — SODIUM CHLORIDE 0.9 % (FLUSH) 0.9 %
10 SYRINGE (ML) INJECTION PRN
Status: DISCONTINUED | OUTPATIENT
Start: 2025-07-18 | End: 2025-07-19

## 2025-07-18 RX ORDER — METHYLERGONOVINE MALEATE 0.2 MG/ML
200 INJECTION INTRAVENOUS PRN
Status: DISCONTINUED | OUTPATIENT
Start: 2025-07-18 | End: 2025-07-19

## 2025-07-18 RX ORDER — MISOPROSTOL 200 UG/1
400 TABLET ORAL PRN
Status: DISCONTINUED | OUTPATIENT
Start: 2025-07-18 | End: 2025-07-19

## 2025-07-18 RX ORDER — ACETAMINOPHEN 325 MG/1
975 TABLET ORAL ONCE
Status: COMPLETED | OUTPATIENT
Start: 2025-07-18 | End: 2025-07-18

## 2025-07-18 RX ORDER — LIDOCAINE HYDROCHLORIDE AND EPINEPHRINE 20; 5 MG/ML; UG/ML
INJECTION, SOLUTION EPIDURAL; INFILTRATION; INTRACAUDAL; PERINEURAL
Status: DISCONTINUED | OUTPATIENT
Start: 2025-07-18 | End: 2025-07-18 | Stop reason: SDUPTHER

## 2025-07-18 RX ORDER — LIDOCAINE HYDROCHLORIDE 10 MG/ML
INJECTION, SOLUTION EPIDURAL; INFILTRATION; INTRACAUDAL; PERINEURAL
Status: DISCONTINUED | OUTPATIENT
Start: 2025-07-18 | End: 2025-07-18 | Stop reason: SDUPTHER

## 2025-07-18 RX ORDER — ROPIVACAINE HYDROCHLORIDE 2 MG/ML
INJECTION, SOLUTION EPIDURAL; INFILTRATION; PERINEURAL
Status: DISCONTINUED | OUTPATIENT
Start: 2025-07-18 | End: 2025-07-18 | Stop reason: SDUPTHER

## 2025-07-18 RX ORDER — LOPERAMIDE HYDROCHLORIDE 2 MG/1
2 CAPSULE ORAL PRN
Status: DISCONTINUED | OUTPATIENT
Start: 2025-07-18 | End: 2025-07-19

## 2025-07-18 RX ORDER — CARBOPROST TROMETHAMINE 250 UG/ML
250 INJECTION, SOLUTION INTRAMUSCULAR PRN
Status: DISCONTINUED | OUTPATIENT
Start: 2025-07-18 | End: 2025-07-19

## 2025-07-18 RX ORDER — FENTANYL CITRATE 50 UG/ML
100 INJECTION, SOLUTION INTRAMUSCULAR; INTRAVENOUS
Status: DISCONTINUED | OUTPATIENT
Start: 2025-07-18 | End: 2025-07-18 | Stop reason: HOSPADM

## 2025-07-18 RX ORDER — ROPIVACAINE HYDROCHLORIDE 2 MG/ML
10 INJECTION, SOLUTION EPIDURAL; INFILTRATION; PERINEURAL CONTINUOUS
Status: DISCONTINUED | OUTPATIENT
Start: 2025-07-18 | End: 2025-07-19

## 2025-07-18 RX ORDER — ONDANSETRON 2 MG/ML
4 INJECTION INTRAMUSCULAR; INTRAVENOUS ONCE
Status: COMPLETED | OUTPATIENT
Start: 2025-07-18 | End: 2025-07-18

## 2025-07-18 RX ORDER — TRANEXAMIC ACID 100 MG/ML
INJECTION, SOLUTION INTRAVENOUS
Status: DISCONTINUED | OUTPATIENT
Start: 2025-07-18 | End: 2025-07-18 | Stop reason: SDUPTHER

## 2025-07-18 RX ORDER — SODIUM CHLORIDE, SODIUM LACTATE, POTASSIUM CHLORIDE, CALCIUM CHLORIDE 600; 310; 30; 20 MG/100ML; MG/100ML; MG/100ML; MG/100ML
INJECTION, SOLUTION INTRAVENOUS CONTINUOUS
Status: DISCONTINUED | OUTPATIENT
Start: 2025-07-18 | End: 2025-07-19

## 2025-07-18 RX ORDER — TERBUTALINE SULFATE 1 MG/ML
0.25 INJECTION SUBCUTANEOUS
Status: DISCONTINUED | OUTPATIENT
Start: 2025-07-18 | End: 2025-07-19

## 2025-07-18 RX ORDER — FAMOTIDINE 10 MG/ML
20 INJECTION, SOLUTION INTRAVENOUS ONCE
Status: COMPLETED | OUTPATIENT
Start: 2025-07-18 | End: 2025-07-18

## 2025-07-18 RX ORDER — MORPHINE SULFATE 0.5 MG/ML
INJECTION, SOLUTION EPIDURAL; INTRATHECAL; INTRAVENOUS
Status: DISCONTINUED | OUTPATIENT
Start: 2025-07-18 | End: 2025-07-18 | Stop reason: SDUPTHER

## 2025-07-18 RX ORDER — ONDANSETRON 4 MG/1
4 TABLET, ORALLY DISINTEGRATING ORAL EVERY 8 HOURS PRN
Status: DISCONTINUED | OUTPATIENT
Start: 2025-07-18 | End: 2025-07-19

## 2025-07-18 RX ORDER — CITRIC ACID/SODIUM CITRATE 334-500MG
30 SOLUTION, ORAL ORAL ONCE
Status: COMPLETED | OUTPATIENT
Start: 2025-07-18 | End: 2025-07-18

## 2025-07-18 RX ORDER — LIDOCAINE HYDROCHLORIDE AND EPINEPHRINE 15; 5 MG/ML; UG/ML
INJECTION, SOLUTION EPIDURAL
Status: DISCONTINUED | OUTPATIENT
Start: 2025-07-18 | End: 2025-07-18 | Stop reason: SDUPTHER

## 2025-07-18 RX ORDER — SODIUM CHLORIDE 0.9 % (FLUSH) 0.9 %
10 SYRINGE (ML) INJECTION EVERY 12 HOURS SCHEDULED
Status: DISCONTINUED | OUTPATIENT
Start: 2025-07-18 | End: 2025-07-19

## 2025-07-18 RX ORDER — SODIUM CHLORIDE, SODIUM LACTATE, POTASSIUM CHLORIDE, AND CALCIUM CHLORIDE .6; .31; .03; .02 G/100ML; G/100ML; G/100ML; G/100ML
500 INJECTION, SOLUTION INTRAVENOUS PRN
Status: DISCONTINUED | OUTPATIENT
Start: 2025-07-18 | End: 2025-07-19

## 2025-07-18 RX ORDER — DIPHENHYDRAMINE HCL 25 MG
25 TABLET ORAL EVERY 6 HOURS PRN
Status: DISCONTINUED | OUTPATIENT
Start: 2025-07-18 | End: 2025-07-19

## 2025-07-18 RX ORDER — SODIUM CHLORIDE, SODIUM LACTATE, POTASSIUM CHLORIDE, AND CALCIUM CHLORIDE .6; .31; .03; .02 G/100ML; G/100ML; G/100ML; G/100ML
1000 INJECTION, SOLUTION INTRAVENOUS ONCE
Status: COMPLETED | OUTPATIENT
Start: 2025-07-18 | End: 2025-07-18

## 2025-07-18 RX ORDER — LIDOCAINE HYDROCHLORIDE 10 MG/ML
30 INJECTION, SOLUTION EPIDURAL; INFILTRATION; INTRACAUDAL; PERINEURAL PRN
Status: DISCONTINUED | OUTPATIENT
Start: 2025-07-18 | End: 2025-07-19

## 2025-07-18 RX ORDER — ONDANSETRON 2 MG/ML
4 INJECTION INTRAMUSCULAR; INTRAVENOUS EVERY 6 HOURS PRN
Status: DISCONTINUED | OUTPATIENT
Start: 2025-07-18 | End: 2025-07-19

## 2025-07-18 RX ADMIN — PHENYLEPHRINE HYDROCHLORIDE 100 MCG: 10 INJECTION INTRAVENOUS at 21:19

## 2025-07-18 RX ADMIN — PHENYLEPHRINE HYDROCHLORIDE 100 MCG: 10 INJECTION INTRAVENOUS at 20:54

## 2025-07-18 RX ADMIN — PHENYLEPHRINE HYDROCHLORIDE 200 MCG: 10 INJECTION INTRAVENOUS at 20:37

## 2025-07-18 RX ADMIN — SODIUM CHLORIDE, SODIUM LACTATE, POTASSIUM CHLORIDE, AND CALCIUM CHLORIDE: .6; .31; .03; .02 INJECTION, SOLUTION INTRAVENOUS at 15:05

## 2025-07-18 RX ADMIN — ONDANSETRON 4 MG: 2 INJECTION INTRAMUSCULAR; INTRAVENOUS at 14:07

## 2025-07-18 RX ADMIN — TRANEXAMIC ACID 1000 MG: 100 INJECTION, SOLUTION INTRAVENOUS at 20:55

## 2025-07-18 RX ADMIN — ROPIVACAINE HYDROCHLORIDE 5 ML: 2 INJECTION, SOLUTION EPIDURAL; INFILTRATION at 13:30

## 2025-07-18 RX ADMIN — SODIUM CITRATE AND CITRIC ACID MONOHYDRATE 30 ML: 500; 334 SOLUTION ORAL at 20:02

## 2025-07-18 RX ADMIN — LIDOCAINE HYDROCHLORIDE 3 ML: 10 INJECTION, SOLUTION EPIDURAL; INFILTRATION; INTRACAUDAL; PERINEURAL at 13:16

## 2025-07-18 RX ADMIN — Medication 1 MILLI-UNITS/MIN: at 15:03

## 2025-07-18 RX ADMIN — PHENYLEPHRINE HYDROCHLORIDE 100 MCG: 10 INJECTION INTRAVENOUS at 14:02

## 2025-07-18 RX ADMIN — ROPIVACAINE HYDROCHLORIDE 5 ML: 2 INJECTION, SOLUTION EPIDURAL; INFILTRATION at 13:25

## 2025-07-18 RX ADMIN — PHENYLEPHRINE HYDROCHLORIDE 100 MCG: 10 INJECTION INTRAVENOUS at 20:43

## 2025-07-18 RX ADMIN — LIDOCAINE HYDROCHLORIDE,EPINEPHRINE BITARTRATE 5 ML: 15; .005 INJECTION, SOLUTION EPIDURAL; INFILTRATION; INTRACAUDAL; PERINEURAL at 13:23

## 2025-07-18 RX ADMIN — ROPIVACAINE HYDROCHLORIDE 10 ML/HR: 2 INJECTION, SOLUTION EPIDURAL; INFILTRATION at 13:33

## 2025-07-18 RX ADMIN — PHENYLEPHRINE HYDROCHLORIDE 200 MCG: 10 INJECTION INTRAVENOUS at 21:11

## 2025-07-18 RX ADMIN — Medication 87.3 MILLI-UNITS/MIN: at 21:04

## 2025-07-18 RX ADMIN — FAMOTIDINE 20 MG: 10 INJECTION, SOLUTION INTRAVENOUS at 20:02

## 2025-07-18 RX ADMIN — CEFAZOLIN 3 G: 3 INJECTION, POWDER, FOR SOLUTION INTRAVENOUS at 20:17

## 2025-07-18 RX ADMIN — AZITHROMYCIN MONOHYDRATE 500 MG: 500 INJECTION, POWDER, LYOPHILIZED, FOR SOLUTION INTRAVENOUS at 20:11

## 2025-07-18 RX ADMIN — PHENYLEPHRINE HYDROCHLORIDE 200 MCG: 10 INJECTION INTRAVENOUS at 20:57

## 2025-07-18 RX ADMIN — ACETAMINOPHEN 975 MG: 325 TABLET ORAL at 20:03

## 2025-07-18 RX ADMIN — SODIUM CHLORIDE, SODIUM LACTATE, POTASSIUM CHLORIDE, AND CALCIUM CHLORIDE: .6; .31; .03; .02 INJECTION, SOLUTION INTRAVENOUS at 12:37

## 2025-07-18 RX ADMIN — PHENYLEPHRINE HYDROCHLORIDE 100 MCG: 10 INJECTION INTRAVENOUS at 14:23

## 2025-07-18 RX ADMIN — ONDANSETRON 4 MG: 2 INJECTION INTRAMUSCULAR; INTRAVENOUS at 20:02

## 2025-07-18 RX ADMIN — SODIUM CHLORIDE, POTASSIUM CHLORIDE, SODIUM LACTATE AND CALCIUM CHLORIDE 1000 ML: 600; 310; 30; 20 INJECTION, SOLUTION INTRAVENOUS at 20:18

## 2025-07-18 RX ADMIN — PHENYLEPHRINE HYDROCHLORIDE 200 MCG: 10 INJECTION INTRAVENOUS at 20:28

## 2025-07-18 RX ADMIN — SODIUM CHLORIDE 3000 MG: 9 INJECTION, SOLUTION INTRAVENOUS at 20:18

## 2025-07-18 RX ADMIN — LIDOCAINE HYDROCHLORIDE,EPINEPHRINE BITARTRATE 5 ML: 20; .005 INJECTION, SOLUTION EPIDURAL; INFILTRATION; INTRACAUDAL; PERINEURAL at 20:05

## 2025-07-18 RX ADMIN — PHENYLEPHRINE HYDROCHLORIDE 200 MCG: 10 INJECTION INTRAVENOUS at 21:24

## 2025-07-18 RX ADMIN — LIDOCAINE HYDROCHLORIDE,EPINEPHRINE BITARTRATE 5 ML: 20; .005 INJECTION, SOLUTION EPIDURAL; INFILTRATION; INTRACAUDAL; PERINEURAL at 20:10

## 2025-07-18 RX ADMIN — LIDOCAINE HYDROCHLORIDE,EPINEPHRINE BITARTRATE 5 ML: 20; .005 INJECTION, SOLUTION EPIDURAL; INFILTRATION; INTRACAUDAL; PERINEURAL at 20:17

## 2025-07-18 RX ADMIN — PHENYLEPHRINE HYDROCHLORIDE 200 MCG: 10 INJECTION INTRAVENOUS at 14:44

## 2025-07-18 RX ADMIN — MORPHINE SULFATE 3.5 MG: 0.5 INJECTION, SOLUTION EPIDURAL; INTRATHECAL; INTRAVENOUS at 21:02

## 2025-07-18 NOTE — ANESTHESIA PROCEDURE NOTES
Epidural Block    Patient location during procedure: OB  Start time: 7/18/2025 1:16 PM  End time: 7/18/2025 1:35 PM  Reason for block: labor epidural  Staffing  Performed: resident/CRNA   Resident/CRNA: Benoit Alejandro APRN - CRNA  Performed by: Benoit Alejandro APRN - CRNA  Authorized by: Benoit Alejandro APRN - CRNA    Epidural  Patient position: sitting  Prep: ChloraPrep  Patient monitoring: continuous pulse ox and frequent blood pressure checks  Approach: midline  Location: L3-4  Injection technique: YUE saline  Provider prep: mask and sterile gloves  Needle  Needle type: Tuohy   Needle gauge: 17 G  Needle length: 3.5 in  Needle insertion depth: 8 cm  Catheter type: multi-orifice  Catheter size: 19 G  Catheter at skin depth: 15 cm  Test dose: negativeCatheter Secured: tegaderm and tape  Assessment  Sensory level: T6  Hemodynamics: stable  Attempts: 1  Outcomes: patient tolerated procedure well  Preanesthetic Checklist  Completed: patient identified, IV checked, site marked, risks and benefits discussed, surgical/procedural consents, equipment checked, pre-op evaluation, timeout performed, anesthesia consent given, oxygen available, monitors applied/VS acknowledged, fire risk safety assessment completed and verbalized and blood product R/B/A discussed and consented           None

## 2025-07-18 NOTE — PROGRESS NOTES
Patient requesting epidural, fluid bolus started and CRNA notified.     1310 CRNA to bedside. Informed consent reviewed and signed with patient, all questions answered.   Patient positioned at side of bed for epidural placement.   1316 Time out completed.   1322 Catheter In  1323 Test dose given. Patient tolerated well, sequential vital signs being assessed.   1327 Bolus dose given.     Patient positioned with left lying tilt. EFM and TOCO adjusted. RN at continuous attendance at bedside.

## 2025-07-18 NOTE — OP NOTE
Department of Obstetrics and Gynecology  Labor and Delivery  Operative Report  Name:  Marlene Antunez   CSN: [id number]   Attending Provider: Hilary Dominguez DO  Location:  1/MR1-A   : 1995   Age: 29 y.o.     Section Operative Report    Date of surgery:   2025      Surgeon:   Hilary Dominguez DO    First Assistant:   Cecile Diaz RN  The use of a first assistant was necessary for the proper positioning, prepping, and draping of the patient, as well as the safe and expeditious execution of the case and closure of skin and subcutaneous tissues.    Pre-op Diagnosis:   1. 39w2d IUP  2. Suspected fetal macrosomia, maternal request  3. Onset of labor  4. GHTN  5. Obesity in pregnancy, BMI 47.52    Post-op Diagnosis:   1. 39w2d IUP  2. Suspected fetal macrosomia, maternal request  3. Onset of labor  4. GHTN  5. Obesity in pregnancy, BMI 47.52  6. Live male infant 8lb 8oz APGARs: 1min: 7, 5min: 9      Procedure:   PLTCS    Complications:   None    Anesthesia:   Epidural    QBL:  1034 cc    Specimen:  Cord blood    Indication:   The patient is a 29 y.o.  @ 39w2d presented for onset of labor. Patient was 6 cm and AROM performed after cervix did not change. Pitocin was started at low dose for augmentation. Patient got epidural and made change to 10cm with fetal station -2. Patient labored down for approximately 1 hour. Fetal station then 0. Did discuss at this time recommendation to begin pushing. Patient and partner with questions regarding risk of shoulder dystocia. Partner reports he was a 10lb baby and one of his bones was broken during delivery. EFW 84.1%, AC 98.4% on . Discussed risk of shoulder dystocia, patient and partner with questions and desired to talk with family. Did discuss option of starting to push and assess fetal descent. After talking with family patient desires to proceed with PLTCS due to suspected fetal macrosomia.     Findings: Uterus, tubes and ovaries consistent with

## 2025-07-18 NOTE — PROGRESS NOTES
Department of Obstetrics and Gynecology  Labor and Delivery   Attending Progress Note    Name:  Marlene Antunez   : 1995   CSN: [id number]     SUBJECTIVE:  Patient doing well. Comfortable with epidural.    OBJECTIVE:      Blood pressure (!) 146/68, pulse (!) 108, temperature 99 °F (37.2 °C), temperature source Oral, resp. rate 20, last menstrual period 10/16/2024, SpO2 99%, not currently breastfeeding.    Fetal heart rate:       Baseline Heart Rate:  150 bpm       Accelerations: Present       Long Term Variability:  Moderate       Decelerations:  None    Contraction frequency: regular every 1-3 min    Membranes:  AROM 1412     Cervix: 10/100/0             ASSESSMENT AND PLAN  IUP at 39w2d weeks admitted for onset of labor  Continue close monitoring  Patient has been laboring down the past hour due to high fetal station. Fetal station now 0. Did discuss at this time recommendation to begin pushing. Patient and partner with questions regarding risk of shoulder dystocia. Partner reports he was a 10lb baby and one of his bones was broken during delivery. EFW 84.1%, AC 98.4% on . Discussed risk of shoulder dystocia, patient and partner with questions and desired to talk with family. Did discuss option of starting to push and assess fetal descent. After talking with family patient desires to proceed with PLTCS due to suspected fetal macrosomia.     Patient was counseled on the risks, benefits, alternatives and indications of  delivery. Risks include, but are not limited to infection, bleeding possibly requiring transfusion (with associated risks of HIV, hepatitis, and other blood-borne illnesses) or hysterectomy, injury to surrounding organs, vasculature, and nerves, injury to fetus, post-operative thromboembolic events (including DVT and PE), risk of anesthesia, and risk of death.  Risk for future pregnancy and delivery concerns including risk of trial of labor after  section and repeat

## 2025-07-18 NOTE — ANESTHESIA PRE PROCEDURE
Department of Anesthesiology  Preprocedure Note       Name:  Marlene Antunez   Age:  29 y.o.  :  1995                                          MRN:  [id number]         Date:  2025      Surgeon: * No surgeons listed *    Procedure: * No procedures listed *    Medications prior to admission:   Prior to Admission medications    Medication Sig Start Date End Date Taking? Authorizing Provider   Prenatal MV-Min-FA-Omega-3 (PRENATAL GUMMIES/DHA & FA) 0.4-32.5 MG CHEW Take one by mouth daily 24  Yes Susy Torres PA-C   aspirin 81 MG EC tablet Take 1 tablet by mouth daily  Patient not taking: Reported on 2025    Isabelle Salinas MD   progesterone (PROMETRIUM) 200 MG CAPS capsule TAKE 1 CAPSULE BY MOUTH EVERY DAY AT BEDTIME  Patient not taking: Reported on 6/3/2025 12/19/24   Isabelle Salinas MD       Current medications:    Current Facility-Administered Medications   Medication Dose Route Frequency Provider Last Rate Last Admin   • ROPivacaine (NAROPIN) 0.2% injection 0.2%  10 mL/hr Epidural Continuous Benoit Alejandro APRN - CRNA       • naloxone 0.4 mg in 10 mL sodium chloride syringe   IntraVENous PRN Benoit Alejandro APRN - CRNA       • diphenhydrAMINE (BENADRYL) injection 25 mg  25 mg IntraVENous Q6H PRN Benoit Alejandro APRN - CRNA        Or   • diphenhydrAMINE (BENADRYL) tablet 25 mg  25 mg Oral Q6H PRN Benoit Alejandro APRN - CRNA       • lactated ringers infusion   IntraVENous Continuous Hilary Dominguez  mL/hr at 25 1237 New Bag at 25 1237   • lactated ringers bolus 500 mL  500 mL IntraVENous PRN Hilary Dominguez DO        Or   • lactated ringers bolus 1,000 mL  1,000 mL IntraVENous PRN Hilary Dominguez, DO       • sodium chloride flush 0.9 % injection 5-40 mL  5-40 mL IntraVENous 2 times per day Hilary Dominguez, DO       • sodium chloride flush 0.9 % injection 5-40 mL  5-40 mL IntraVENous PRN Hilary Dominguez, DO       • 0.9 % sodium chloride infusion   IntraVENous PRN Alberto  Eczema L30.9    Morbid obesity ([facility]) E66.01    Chronic nonintractable headache R51.9, G89.29    LEIGHTON (obstructive sleep apnea) G47.33    Hypersomnia G47.10    Morbid obesity with BMI of 40.0-44.9, adult ([facility]) E66.01, Z68.41    Decreased fetal movement affecting management of mother, antepartum O36.8190    Labor and delivery indication for care or intervention O75.9    Excessive fetal growth affecting management of pregnancy in third trimester O36.63X0    Obesity in pregnancy O99.210    Gestational hypertension O13.9       Past Medical History:        Diagnosis Date    Dysmenorrhea     History of miscarriage     Irregular menses     Menorrhagia     Pregnant        Past Surgical History:        Procedure Laterality Date    TONSILLECTOMY         Social History:    Social History     Tobacco Use    Smoking status: Never    Smokeless tobacco: Never   Substance Use Topics    Alcohol use: Not Currently     Comment: occ                                Counseling given: Not Answered      Vital Signs (Current):   Vitals:    07/18/25 1327 07/18/25 1329 07/18/25 1332 07/18/25 1341   BP: 136/73 137/65 (!) 114/55 (!) 91/47   Pulse: (!) 108 (!) 110 (!) 112 79   Resp:       Temp:       TempSrc:       SpO2: 99% 99% 100%                                               BP Readings from Last 3 Encounters:   07/18/25 (!) 91/47   07/15/25 131/86   07/10/25 (!) 140/82       NPO Status:                                                                                 BMI:   Wt Readings from Last 3 Encounters:   07/15/25 125.6 kg (277 lb)   07/10/25 122.8 kg (270 lb 11.2 oz)   07/08/25 122 kg (269 lb)     There is no height or weight on file to calculate BMI.    CBC:   Lab Results   Component Value Date/Time    WBC 8.2 07/18/2025 12:30 PM    RBC 4.62 07/18/2025 12:30 PM    HGB 13.2 07/18/2025 12:30 PM    HCT 39.7 07/18/2025 12:30 PM    MCV 85.9 07/18/2025 12:30 PM    RDW 15.6 07/18/2025 12:30 PM     04/19/2025 09:46 AM       CMP: No

## 2025-07-18 NOTE — PROGRESS NOTES
Patient arrived ambulatory to Labor & Delivery for triage for contractions that started at 0530.    Patient shown to room and instructed to place on gown and obtain urine specimen. Patient oriented to room and call light.    EFM & toco applied, +FHR. Abdomen soft and non tender to palpation. Patient denies headache, epigastric pain, abdominal pain and contractions. Patient reports feeling her baby move well. Denies vaginal bleeding or leaking of fluid.

## 2025-07-18 NOTE — PLAN OF CARE
Problem: Vaginal Birth or  Section  Goal: Fetal and maternal status remain reassuring during the birth process  Description:  Birth OB-Pregnancy care plan goal which identifies if the fetal and maternal status remain reassuring during the birth process  2025 by Yamileth Hoffman RN  Outcome: Progressing  2025 1254 by Erica Weathers RN  Outcome: Progressing     Problem: Postpartum  Goal: Experiences normal postpartum course  Description:  Postpartum OB-Pregnancy care plan goal which identifies if the mother is experiencing a normal postpartum course  2025 by Yamileth Hoffman RN  Outcome: Progressing  2025 1254 by Erica Weathers RN  Outcome: Progressing  Goal: Appropriate maternal -  bonding  Description:  Postpartum OB-Pregnancy care plan goal which identifies if the mother and  are bonding appropriately  2025 by Yamileth Hoffman RN  Outcome: Progressing  2025 1254 by Erica Weathers RN  Outcome: Progressing  Goal: Establishment of infant feeding pattern  Description:  Postpartum OB-Pregnancy care plan goal which identifies if the mother is establishing a feeding pattern with their   2025 by Yamileth Hoffman RN  Outcome: Progressing  2025 1254 by Erica Weathers RN  Outcome: Progressing  Goal: Incisions, wounds, or drain sites healing without S/S of infection  2025 by Yamileth Hoffman RN  Outcome: Progressing  2025 1254 by Erica Weathers RN  Outcome: Progressing     Problem: Pain  Goal: Verbalizes/displays adequate comfort level or baseline comfort level  2025 by Yamileth Hoffman RN  Outcome: Progressing  2025 1254 by Erica Weathers RN  Outcome: Progressing     Problem: Infection - Adult  Goal: Absence of infection at discharge  2025 by Yamileth Hoffman RN  Outcome: Progressing  2025 1254 by Erica Weathers RN  Outcome: Progressing  Goal: Absence of infection during

## 2025-07-18 NOTE — PROGRESS NOTES
Department of Obstetrics and Gynecology  Labor and Delivery   Midwifery Progress Note      SUBJECTIVE:  Pt resting in bed, discussed starting pitocin for labor augmentation, pt agreeable.     OBJECTIVE:      Fetal heart rate:       Baseline Heart Rate:  135        Accelerations:  present       Variability:  moderate       Decelerations:  absent         Contraction frequency: 5 minutes    Membranes:  Ruptured clear fluid    Cervix:         Dilation:  6 cm         Effacement:  90         Station:  -3         Position:  mid             ASSESSMENT     - CAT 1 FHT   - AROM clear fluid   - Epidural in place   PLAN:    - Start pitocin   - CEFM       I have collaborated and updated Dr Dominguez  and the MD agrees with the current POC.     TOOTIE Hughes - LILIA

## 2025-07-18 NOTE — H&P
Department of Obstetrics and Gynecology  Labor and Delivery  History and Physical   Name:  Marlene Antunez   CSN: [id number]   Attending Provider: Hilary Dominguez DO  Location:  LD01/LD01-A   : 1995   Age: 29 y.o.    REASON FOR ADMISSION: Onset of labor    SUBJECTIVE:  The patient is a 29 y.o.  at 39w2d weeks who presented to Labor and Delivery with onset of labor.    +FM. Patient denies vaginal bleeding, denies leakage of fluid, reports contractions, denies headache, denies vision changes, denies right upper quadrant pain and denies edema.    ROS:  General: Denies fevers, chills, excessive fatigue  CV: Denies chest pain, shortness of breath, palpitations  Resp: Denies shortness of breath, cough, pain upon inspiration  GI: Denies abnormal bowel movements, change in stool color, hematochezia  : Denies dysuria, hematuria, foul odor  HEENT: Denies vision changes, difficulty breathing, sore throat  Neuro: Denies dizziness, weakness or tingling in unilateral or bilateral extremities  MSK: Denies unilateral swelling, back pain, restricted mobility  Skin: Denies rashes, erythema, or excessive bruising  Psych: Denies severe mood swings, changes in appetite or sleep habits    Pregnancy complications:  Present on Admission:   Labor and delivery indication for care or intervention   Excessive fetal growth affecting management of pregnancy in third trimester   Obesity in pregnancy   Gestational hypertension      OBJECTIVE    Vitals:  BP (!) 137/99   Pulse (!) 101   LMP 10/16/2024     Constitutional:  Alert and oriented, no acute distress  Lungs: Unlabored breathing  Abdomen: Gravid, soft, non-tender and non-distended. No pinpoint tenderness  Musculoskeletal: No LE edema or calf TTP    Cervix:  6/90/-2 per RN    Fetal Position:    Cephalic per RN    Membranes:    Intact    Fetal heart rate:        Baseline FHR :    145 bpm              Fetal Accelerations:  Present        Fetal Decelerations:         None

## 2025-07-18 NOTE — PROGRESS NOTES
Department of Obstetrics and Gynecology  Labor and Delivery   Midwifery Progress Note      SUBJECTIVE:  Pt in bed comfortable with epidural ,consents to FSE as baby has been challenging to monitor , continues to leak clear fluid.     OBJECTIVE:      Fetal heart rate:       Baseline Heart Rate:  140        Accelerations:  present       Variability:  moderate       Decelerations:  absent         Contraction frequency: 3 minutes    Membranes:  Ruptured clear fluid    Cervix:         Dilation:  Complete         Effacement:  100%         Station:  0         Position:  mid             ASSESSMENT     - CAT 1 FHT   - FSE placed with ease     PLAN:    - Labor down   - Discussed with pt concerns for possible shoulder dystocia due to suspected LGA baby , reviewed measures that would be taken if shoulder occurs.     I have collaborated and updated Dr Dominguez  and the MD agrees with the current POC.     TOOTIE Hughes - LILIA

## 2025-07-19 LAB
ALBUMIN SERPL-MCNC: 3 G/DL (ref 3.4–5)
ALBUMIN/GLOB SERPL: 1.6 {RATIO} (ref 1.1–2.2)
ALP SERPL-CCNC: 100 U/L (ref 40–129)
ALT SERPL-CCNC: 16 U/L (ref 10–40)
ANION GAP SERPL CALCULATED.3IONS-SCNC: 9 MMOL/L (ref 9–17)
AST SERPL-CCNC: 25 U/L (ref 15–37)
BILIRUB SERPL-MCNC: 0.5 MG/DL (ref 0–1)
BUN SERPL-MCNC: 7 MG/DL (ref 7–20)
CALCIUM SERPL-MCNC: 8.8 MG/DL (ref 8.3–10.6)
CHLORIDE SERPL-SCNC: 107 MMOL/L (ref 99–110)
CO2 SERPL-SCNC: 21 MMOL/L (ref 21–32)
CREAT SERPL-MCNC: 0.8 MG/DL (ref 0.6–1.1)
ERYTHROCYTE [DISTWIDTH] IN BLOOD BY AUTOMATED COUNT: 15.9 % (ref 11.7–14.9)
GFR, ESTIMATED: 87 ML/MIN/1.73M2
GLUCOSE SERPL-MCNC: 99 MG/DL (ref 74–99)
HCT VFR BLD AUTO: 33.7 % (ref 37–47)
HGB BLD-MCNC: 11.1 G/DL (ref 12.5–16)
MCH RBC QN AUTO: 28.9 PG (ref 27–31)
MCHC RBC AUTO-ENTMCNC: 32.9 G/DL (ref 32–36)
MCV RBC AUTO: 87.8 FL (ref 78–100)
PLATELET, FLUORESCENCE: 117 K/UL (ref 140–440)
PMV BLD AUTO: 12 FL (ref 7.5–11.1)
POTASSIUM SERPL-SCNC: 4.4 MMOL/L (ref 3.5–5.1)
PROT SERPL-MCNC: 4.9 G/DL (ref 6.4–8.2)
RBC # BLD AUTO: 3.84 M/UL (ref 4.2–5.4)
SODIUM SERPL-SCNC: 137 MMOL/L (ref 136–145)
WBC OTHER # BLD: 12.9 K/UL (ref 4–10.5)

## 2025-07-19 PROCEDURE — 2500000003 HC RX 250 WO HCPCS: Performed by: STUDENT IN AN ORGANIZED HEALTH CARE EDUCATION/TRAINING PROGRAM

## 2025-07-19 PROCEDURE — 6370000000 HC RX 637 (ALT 250 FOR IP): Performed by: STUDENT IN AN ORGANIZED HEALTH CARE EDUCATION/TRAINING PROGRAM

## 2025-07-19 PROCEDURE — 85027 COMPLETE CBC AUTOMATED: CPT

## 2025-07-19 PROCEDURE — 1220000000 HC SEMI PRIVATE OB R&B

## 2025-07-19 PROCEDURE — 80053 COMPREHEN METABOLIC PANEL: CPT

## 2025-07-19 PROCEDURE — APPNB30 APP NON BILLABLE TIME 0-30 MINS: Performed by: ADVANCED PRACTICE MIDWIFE

## 2025-07-19 PROCEDURE — 6360000002 HC RX W HCPCS: Performed by: STUDENT IN AN ORGANIZED HEALTH CARE EDUCATION/TRAINING PROGRAM

## 2025-07-19 RX ORDER — NALBUPHINE HYDROCHLORIDE 10 MG/ML
10 INJECTION INTRAMUSCULAR; INTRAVENOUS; SUBCUTANEOUS EVERY 4 HOURS PRN
Status: DISCONTINUED | OUTPATIENT
Start: 2025-07-19 | End: 2025-07-20 | Stop reason: HOSPADM

## 2025-07-19 RX ORDER — LOPERAMIDE HYDROCHLORIDE 2 MG/1
2 CAPSULE ORAL PRN
Status: DISCONTINUED | OUTPATIENT
Start: 2025-07-19 | End: 2025-07-20 | Stop reason: HOSPADM

## 2025-07-19 RX ORDER — FERROUS SULFATE 325(65) MG
325 TABLET ORAL EVERY OTHER DAY
Status: DISCONTINUED | OUTPATIENT
Start: 2025-07-19 | End: 2025-07-20 | Stop reason: HOSPADM

## 2025-07-19 RX ORDER — DIPHENHYDRAMINE HYDROCHLORIDE 50 MG/ML
25 INJECTION, SOLUTION INTRAMUSCULAR; INTRAVENOUS EVERY 6 HOURS PRN
Status: DISCONTINUED | OUTPATIENT
Start: 2025-07-19 | End: 2025-07-20 | Stop reason: HOSPADM

## 2025-07-19 RX ORDER — DIPHENHYDRAMINE HCL 25 MG
25 TABLET ORAL EVERY 6 HOURS PRN
Status: DISCONTINUED | OUTPATIENT
Start: 2025-07-19 | End: 2025-07-20 | Stop reason: HOSPADM

## 2025-07-19 RX ORDER — OXYCODONE HYDROCHLORIDE 5 MG/1
5 TABLET ORAL EVERY 4 HOURS PRN
Status: DISCONTINUED | OUTPATIENT
Start: 2025-07-19 | End: 2025-07-20 | Stop reason: HOSPADM

## 2025-07-19 RX ORDER — SODIUM CHLORIDE 9 MG/ML
INJECTION, SOLUTION INTRAVENOUS PRN
Status: DISCONTINUED | OUTPATIENT
Start: 2025-07-19 | End: 2025-07-20 | Stop reason: HOSPADM

## 2025-07-19 RX ORDER — ACETAMINOPHEN 500 MG
1000 TABLET ORAL EVERY 8 HOURS
Status: DISCONTINUED | OUTPATIENT
Start: 2025-07-19 | End: 2025-07-20 | Stop reason: HOSPADM

## 2025-07-19 RX ORDER — DOCUSATE SODIUM 100 MG/1
100 CAPSULE, LIQUID FILLED ORAL 2 TIMES DAILY
Status: DISCONTINUED | OUTPATIENT
Start: 2025-07-19 | End: 2025-07-20 | Stop reason: HOSPADM

## 2025-07-19 RX ORDER — SODIUM CHLORIDE 0.9 % (FLUSH) 0.9 %
5-40 SYRINGE (ML) INJECTION EVERY 12 HOURS SCHEDULED
Status: DISCONTINUED | OUTPATIENT
Start: 2025-07-19 | End: 2025-07-20 | Stop reason: HOSPADM

## 2025-07-19 RX ORDER — IBUPROFEN 800 MG/1
800 TABLET, FILM COATED ORAL EVERY 8 HOURS
Status: DISCONTINUED | OUTPATIENT
Start: 2025-07-20 | End: 2025-07-20 | Stop reason: HOSPADM

## 2025-07-19 RX ORDER — KETOROLAC TROMETHAMINE 30 MG/ML
30 INJECTION, SOLUTION INTRAMUSCULAR; INTRAVENOUS EVERY 6 HOURS
Status: DISPENSED | OUTPATIENT
Start: 2025-07-19 | End: 2025-07-20

## 2025-07-19 RX ORDER — NALBUPHINE HYDROCHLORIDE 10 MG/ML
5 INJECTION INTRAMUSCULAR; INTRAVENOUS; SUBCUTANEOUS EVERY 4 HOURS PRN
Status: DISCONTINUED | OUTPATIENT
Start: 2025-07-19 | End: 2025-07-20 | Stop reason: HOSPADM

## 2025-07-19 RX ORDER — SODIUM CHLORIDE 0.9 % (FLUSH) 0.9 %
5-40 SYRINGE (ML) INJECTION PRN
Status: DISCONTINUED | OUTPATIENT
Start: 2025-07-19 | End: 2025-07-20 | Stop reason: HOSPADM

## 2025-07-19 RX ORDER — ONDANSETRON 4 MG/1
4 TABLET, ORALLY DISINTEGRATING ORAL EVERY 8 HOURS PRN
Status: DISCONTINUED | OUTPATIENT
Start: 2025-07-19 | End: 2025-07-20 | Stop reason: HOSPADM

## 2025-07-19 RX ORDER — FAMOTIDINE 20 MG/1
20 TABLET, FILM COATED ORAL 2 TIMES DAILY
Status: DISCONTINUED | OUTPATIENT
Start: 2025-07-19 | End: 2025-07-20 | Stop reason: HOSPADM

## 2025-07-19 RX ORDER — ONDANSETRON 2 MG/ML
4 INJECTION INTRAMUSCULAR; INTRAVENOUS EVERY 6 HOURS PRN
Status: DISCONTINUED | OUTPATIENT
Start: 2025-07-19 | End: 2025-07-20 | Stop reason: HOSPADM

## 2025-07-19 RX ORDER — ENOXAPARIN SODIUM 100 MG/ML
40 INJECTION SUBCUTANEOUS DAILY
Status: DISCONTINUED | OUTPATIENT
Start: 2025-07-19 | End: 2025-07-20 | Stop reason: HOSPADM

## 2025-07-19 RX ORDER — KETOROLAC TROMETHAMINE 30 MG/ML
30 INJECTION, SOLUTION INTRAMUSCULAR; INTRAVENOUS PRN
Status: DISCONTINUED | OUTPATIENT
Start: 2025-07-19 | End: 2025-07-19

## 2025-07-19 RX ORDER — OXYCODONE HYDROCHLORIDE 5 MG/1
10 TABLET ORAL EVERY 4 HOURS PRN
Status: DISCONTINUED | OUTPATIENT
Start: 2025-07-19 | End: 2025-07-20 | Stop reason: HOSPADM

## 2025-07-19 RX ADMIN — DOCUSATE SODIUM 100 MG: 100 CAPSULE, LIQUID FILLED ORAL at 21:24

## 2025-07-19 RX ADMIN — SODIUM CHLORIDE, PRESERVATIVE FREE 10 ML: 5 INJECTION INTRAVENOUS at 14:33

## 2025-07-19 RX ADMIN — ACETAMINOPHEN 1000 MG: 500 TABLET ORAL at 21:23

## 2025-07-19 RX ADMIN — ENOXAPARIN SODIUM 40 MG: 100 INJECTION SUBCUTANEOUS at 09:43

## 2025-07-19 RX ADMIN — ACETAMINOPHEN 1000 MG: 500 TABLET ORAL at 12:20

## 2025-07-19 RX ADMIN — KETOROLAC TROMETHAMINE 30 MG: 30 INJECTION, SOLUTION INTRAMUSCULAR; INTRAVENOUS at 01:32

## 2025-07-19 RX ADMIN — KETOROLAC TROMETHAMINE 30 MG: 30 INJECTION, SOLUTION INTRAMUSCULAR; INTRAVENOUS at 14:33

## 2025-07-19 RX ADMIN — DOCUSATE SODIUM 100 MG: 100 CAPSULE, LIQUID FILLED ORAL at 09:43

## 2025-07-19 RX ADMIN — SODIUM CHLORIDE, PRESERVATIVE FREE 10 ML: 5 INJECTION INTRAVENOUS at 01:31

## 2025-07-19 RX ADMIN — FAMOTIDINE 20 MG: 20 TABLET, FILM COATED ORAL at 21:24

## 2025-07-19 RX ADMIN — ONDANSETRON 4 MG: 2 INJECTION INTRAMUSCULAR; INTRAVENOUS at 01:33

## 2025-07-19 ASSESSMENT — PAIN DESCRIPTION - ORIENTATION: ORIENTATION: LOWER;MID

## 2025-07-19 ASSESSMENT — PAIN SCALES - GENERAL
PAINLEVEL_OUTOF10: 2
PAINLEVEL_OUTOF10: 2
PAINLEVEL_OUTOF10: 1

## 2025-07-19 ASSESSMENT — PAIN DESCRIPTION - LOCATION: LOCATION: ABDOMEN

## 2025-07-19 ASSESSMENT — PAIN DESCRIPTION - DESCRIPTORS: DESCRIPTORS: CRAMPING

## 2025-07-19 NOTE — LACTATION NOTE
This is the first baby for mother. Mother delivered by  section at 39+2 weeks. Infant birth weight is 8 pounds 8.5 ounces.  Mother states she was planning on going to a breast feeding class on the  but that is the day she had her  section. Father of baby at bedside and is very supportive. Mother has been breast feeding well since delivery and encouraged mother to hold infant skin to skin as able as long as she is awake. Discussed safe sleep with mother. Reminded mother about infant safe sleep campaign per the [employer]. Instructed mother that the infant should be Alone in crib, no pillows or stuffed animals in the crib and only the blanket the baby needs in the crib with baby. Also, that the baby should be on the Back to sleep. That the baby should be asleep in the Crib, this should be a firm and flat (not inclined) surface. Informed mother that she should not fall asleep with the baby in bed (or couch or chair or anywhere) with her. Mother verbalizes understanding.     Lanolin ointment given to mother. Instructed mother that only small amount is needed if she uses. Informed mother to apply small amount to nipple. Informed mother that she does not need to wipe off lanolin because it is safe for the infant. Informed mother that if nipples get sore she can use lanolin ointment, use her own breast milk and to let nipples air dry. Informed mother that these will help decrease soreness. Mother verbalizes understanding.     Breastfeeding booklet along with feeding log sheets given to mother. Reminded mother that infant should breast feed every 2-3 hours and to offer both breast with each feeding. Informed mother that infant should breast feed 10 minute or longer on each side. Encouraged mother to call for any breast feeding assistance or breast feeding concerns. Mother verbalizes understanding.    Mother states that she already has ordered her electric breast pump and that she will

## 2025-07-19 NOTE — FLOWSHEET NOTE
Notified lab of needing a CBC collect after unit staff attempt.  Lab stated it would \"take awhile\".  Rosa CAMARENA (L & D charge) then notified of need for a CBC and will attempt to collect this lab.

## 2025-07-19 NOTE — PROGRESS NOTES
Department of Obstetrics and Gynecology  Labor and Delivery   Post Partum Progress Note      SUBJECTIVE:  Doing well with no complaints. Has not yet ambulated. Reports bleeding is decreasing and pain is well controlled with medication. Denies pain or discharge at incisional site. Has not yet voided. Has not had BM or passed flatus. Eating and drinking well. Denies HA/visual changes/epigastric pain. Breastfeeding is going well. Reports good social support. Denies emotional concerns at this time.     OBJECTIVE:      Vitals:  /68   Pulse (!) 103   Temp 98.2 °F (36.8 °C) (Oral)   Resp 16   LMP 10/16/2024   SpO2 99%   Breastfeeding Unknown   Lab Results   Component Value Date    WBC 8.2 07/18/2025    HGB 13.2 07/18/2025    HCT 39.7 07/18/2025    MCV 85.9 07/18/2025     04/19/2025       CONSTITUTIONAL: generally well-appearing.   ABDOMEN:  Soft, well contracted uterus. Fundus firm at u-1. C/s incision c/d/i. No erythema or discharge noted. BS present x 4 quadrants.   LOCHIA: Normal per pt  LUNGS: CTAB  HEART: RRR,   EXTREMITIES: No calf tenderness, erythema or swelling bilaterally       ASSESSMENT:      POD # 1  S/p C/s  BMI 45  gHTN  Hypoplastic nasal bone   Breastfeeding well  GBS neg   RH +    PLAN:     Will remove gill. Advised pt she will need to void within 6 hours.   Post-op CBC pending.   Encouraged rest/hydration/ambulation/PO intake.       Time Spent: 10 min    TOOTIE Rosa CNM

## 2025-07-19 NOTE — PROGRESS NOTES
Pt up and ambulating. Ambulated to the bathroom. Catheter removed at this time. Catheter intact. Pt tolerates procedure without difficulty. Encouraged pt to call nurse when needs to void.

## 2025-07-19 NOTE — PLAN OF CARE
Problem: Vaginal Birth or  Section  Goal: Fetal and maternal status remain reassuring during the birth process  Description:  Birth OB-Pregnancy care plan goal which identifies if the fetal and maternal status remain reassuring during the birth process  2025 by Laurie Hua RN  Outcome: Progressing  2025 by Yamileth Hoffman RN  Outcome: Progressing     Problem: Postpartum  Goal: Experiences normal postpartum course  Description:  Postpartum OB-Pregnancy care plan goal which identifies if the mother is experiencing a normal postpartum course  2025 by Laurie Hua RN  Outcome: Progressing  2025 by Yamileth Hoffman RN  Outcome: Progressing  Goal: Appropriate maternal -  bonding  Description:  Postpartum OB-Pregnancy care plan goal which identifies if the mother and  are bonding appropriately  2025 by Laurie Hua RN  Outcome: Progressing  2025 by Yamileth Hoffman RN  Outcome: Progressing  Goal: Establishment of infant feeding pattern  Description:  Postpartum OB-Pregnancy care plan goal which identifies if the mother is establishing a feeding pattern with their   2025 by Laurie Hua RN  Outcome: Progressing  2025 by Yamileth Hoffman RN  Outcome: Progressing  Goal: Incisions, wounds, or drain sites healing without S/S of infection  2025 by Laurie Hua RN  Outcome: Progressing  2025 by Yamileth Hoffman RN  Outcome: Progressing     Problem: Pain  Goal: Verbalizes/displays adequate comfort level or baseline comfort level  2025 by Laurie Hua RN  Outcome: Progressing  Flowsheets (Taken 2025 by Yamileth Hoffman, RN)  Verbalizes/displays adequate comfort level or baseline comfort level: Encourage patient to monitor pain and request assistance  2025 by Yamileth Hoffman RN  Outcome: Progressing     Problem: Infection - Adult  Goal: Absence of infection at

## 2025-07-19 NOTE — FLOWSHEET NOTE
Pt transferred to [facility] with no s/s distress noted, report given to oncoming RN, pt updated on POC, expressed understanding, FOB and baby in [place] at bedside, no further needs voiced, call light in reach

## 2025-07-20 VITALS
TEMPERATURE: 97.9 F | OXYGEN SATURATION: 100 % | HEART RATE: 88 BPM | SYSTOLIC BLOOD PRESSURE: 112 MMHG | DIASTOLIC BLOOD PRESSURE: 58 MMHG | RESPIRATION RATE: 20 BRPM

## 2025-07-20 PROCEDURE — 6370000000 HC RX 637 (ALT 250 FOR IP): Performed by: STUDENT IN AN ORGANIZED HEALTH CARE EDUCATION/TRAINING PROGRAM

## 2025-07-20 PROCEDURE — 99024 POSTOP FOLLOW-UP VISIT: CPT | Performed by: NURSE PRACTITIONER

## 2025-07-20 PROCEDURE — 6360000002 HC RX W HCPCS: Performed by: STUDENT IN AN ORGANIZED HEALTH CARE EDUCATION/TRAINING PROGRAM

## 2025-07-20 RX ORDER — PSEUDOEPHEDRINE HCL 30 MG
100 TABLET ORAL 2 TIMES DAILY
Qty: 30 CAPSULE | Refills: 0 | Status: SHIPPED | OUTPATIENT
Start: 2025-07-20

## 2025-07-20 RX ORDER — IBUPROFEN 800 MG/1
800 TABLET, FILM COATED ORAL EVERY 8 HOURS
Qty: 120 TABLET | Refills: 3 | Status: SHIPPED | OUTPATIENT
Start: 2025-07-20

## 2025-07-20 RX ORDER — FERROUS SULFATE 325(65) MG
325 TABLET ORAL EVERY OTHER DAY
Qty: 30 TABLET | Refills: 3 | Status: SHIPPED | OUTPATIENT
Start: 2025-07-21

## 2025-07-20 RX ORDER — OXYCODONE HYDROCHLORIDE 5 MG/1
5 TABLET ORAL EVERY 6 HOURS PRN
Qty: 10 TABLET | Refills: 0 | Status: SHIPPED | OUTPATIENT
Start: 2025-07-20 | End: 2025-07-23

## 2025-07-20 RX ADMIN — ENOXAPARIN SODIUM 40 MG: 100 INJECTION SUBCUTANEOUS at 09:31

## 2025-07-20 RX ADMIN — DOCUSATE SODIUM 100 MG: 100 CAPSULE, LIQUID FILLED ORAL at 09:31

## 2025-07-20 RX ADMIN — IBUPROFEN 800 MG: 800 TABLET, FILM COATED ORAL at 02:23

## 2025-07-20 RX ADMIN — IBUPROFEN 800 MG: 800 TABLET, FILM COATED ORAL at 09:31

## 2025-07-20 ASSESSMENT — PAIN SCALES - GENERAL: PAINLEVEL_OUTOF10: 1

## 2025-07-20 NOTE — PROGRESS NOTES
Discharge instructions given and reviewed, pt verbalizes understanding. Discussed with mother about postpartum care and also about postpartum depression and signs and symptoms of post partum depression and to call OB doctor for any of these signs and symptoms. Also discussed about preeclampsia/high blood pressure signs and symptoms and to call OB provider if she has any of those symptoms. Discussed breast feeding and pt has breast feeding booklet and is aware to call the [facility] at [phone] for any breast feeding questions or concerns. Written discharge instructions of postpartum care and postpartum depression and preeclampsia/high blood pressure and breastfeeding given to pt.  Pt aware to  her prescriptions at her pharmacy. Pt verbalizes understanding to make and keep appointment with her OB doctor for her to be seen in 2 weeks for incisional check and in 6 weeks for postpartum check. Encouraged mother when calling to make appointment that they may want her to be seen for a blood pressure check within one week. Mother verbalizes understanding. Instructed pt that ID bands need to remain on and once home to properly dispose of them in order to protect their identity. Pt verbalizes understanding.  Pt states that the father of the baby will be taking her and baby home. See After Visit Summary (Discharge Instructions.)    **Discharge Education and Instructions:**    [x] Discharge instructions reviewed with patient    [x] Patient voiced understanding of instructions    [x] Written and verbal opioid education provided - side effects and risk of addiction    [x] Patient verbalized understanding of opioid education    [x] Prescriptions reviewed and electronically sent to pharmacy      **Postpartum Recovery Education:**    [x] Uterine involution, bleeding expectations, and perineal care reviewed    [x] Activity restrictions and warning signs discussed, including s/sx of infection,

## 2025-07-20 NOTE — PLAN OF CARE
Problem: Vaginal Birth or  Section  Goal: Fetal and maternal status remain reassuring during the birth process  Description:  Birth OB-Pregnancy care plan goal which identifies if the fetal and maternal status remain reassuring during the birth process  2025 by Laurie Hua RN  Outcome: Completed  2025 by Teresita Solares RN  Outcome: Progressing     Problem: Postpartum  Goal: Experiences normal postpartum course  Description:  Postpartum OB-Pregnancy care plan goal which identifies if the mother is experiencing a normal postpartum course  2025 by Laurie Hua RN  Outcome: Completed  2025 by Teresita Solares RN  Outcome: Progressing  Goal: Appropriate maternal -  bonding  Description:  Postpartum OB-Pregnancy care plan goal which identifies if the mother and  are bonding appropriately  2025 by Laurie Hua RN  Outcome: Completed  2025 by Teresita Solares RN  Outcome: Progressing  Goal: Establishment of infant feeding pattern  Description:  Postpartum OB-Pregnancy care plan goal which identifies if the mother is establishing a feeding pattern with their   2025 by Laurie Hua RN  Outcome: Completed  2025 by Teresita Solares RN  Outcome: Progressing  Goal: Incisions, wounds, or drain sites healing without S/S of infection  2025 by Laurie Hua RN  Outcome: Completed  2025 by Teresita Solares RN  Outcome: Progressing  Flowsheets (Taken 2025 2210)  Incisions, Wounds, or Drain Sites Healing Without Sign and Symptoms of Infection: TWICE DAILY: Assess and document skin integrity     Problem: Pain  Goal: Verbalizes/displays adequate comfort level or baseline comfort level  2025 by Laurie Hua RN  Outcome: Completed  Flowsheets (Taken 2025 0820)  Verbalizes/displays adequate comfort level or baseline comfort level:   Encourage patient to monitor pain and

## 2025-07-20 NOTE — DISCHARGE SUMMARY
Obstetrical Discharge Form    Gestational Age:  39w2d    Antepartum complications: pregnancy induced hypertension    Date of Delivery:   25      Type of Delivery:    for pt request     Delivered By:    Dr Dominguez              Baby:       Information for the patient's :  Westley Antunez Marlene [[id number]]      Anesthesia:    Epidural and Spinal    Intrapartum complications: None    Feeding method:   breast    Postpartum complications: gestational hypertension    Discharge Date:  25     Condition of discharge:  good    /75   Pulse 83   Temp 98.1 °F (36.7 °C) (Oral)   Resp 20   LMP 10/16/2024   SpO2 100%   Breastfeeding Unknown        Plan:     Discharge teaching completed including counseling on warning signs (heavy vaginal bleeding, s/s of preeclampsia, fever >100.4, ACHES, s/s of PPD).    Follow up    in 2 week(s)

## 2025-07-20 NOTE — PROGRESS NOTES
Pt discharged at this time via wheel chair. Condition stable. Pt verbalizes understanding to make appointment and to keep appointment with OB doctor for her to be seen for incision check and blood pressure check. And then again in 6 weeks for postpartum check. Discharged with infant in car seat and father of the baby.     **POSTPARTUM DISCHARGE NOTE (VAGINAL & )**    [x] Infant and mother ID bands checked and matched    [x] Infant's ID band removed and stapled to footprint sheet    [x] Mother verified ID band as correct and signed footprint sheet    [x] RN witnessed and signed footprint sheet verification    [x] Hugs tag removed from infant prior to discharge    [x] Mother signed Safe Baby Crib Form, verifying she has a safe crib at home      **Mobility and Transportation:**    [x] Baby pink, alert, harnessed into car seat by parents    [x] Transportation confirmed - patient's [/partner/other] driving them home    [x] Parents and baby escorted to hospital exit by RN

## 2025-07-20 NOTE — ANESTHESIA POSTPROCEDURE EVALUATION
Department of Anesthesiology  Postprocedure Note    Patient: Marlene Antunez  MRN: [id number]  YOB: 1995  Date of evaluation: 7/19/2025    Procedure Summary       Date: 07/18/25 Room / Location:     Anesthesia Start: 1316 Anesthesia Stop: 2145    Procedure: Labor Analgesia Diagnosis:     Scheduled Providers:  Responsible Provider: Boyd Venegas MD    Anesthesia Type: Not recorded ASA Status: Not recorded            Anesthesia Type: No value filed.    Rowan Phase I: 10    Rowan Phase II: 10    Anesthesia Post Evaluation    Patient location during evaluation: bedside  Patient participation: complete - patient participated  Level of consciousness: awake and alert  Pain score: 1  Airway patency: patent  Nausea & Vomiting: no nausea and no vomiting  Cardiovascular status: hemodynamically stable  Respiratory status: acceptable, room air, spontaneous ventilation and nonlabored ventilation  Hydration status: euvolemic  Pain management: adequate        No notable events documented.

## 2025-07-20 NOTE — PLAN OF CARE
Problem: Vaginal Birth or  Section  Goal: Fetal and maternal status remain reassuring during the birth process  Description:  Birth OB-Pregnancy care plan goal which identifies if the fetal and maternal status remain reassuring during the birth process  Outcome: Progressing     Problem: Postpartum  Goal: Experiences normal postpartum course  Description:  Postpartum OB-Pregnancy care plan goal which identifies if the mother is experiencing a normal postpartum course  Outcome: Progressing  Goal: Appropriate maternal -  bonding  Description:  Postpartum OB-Pregnancy care plan goal which identifies if the mother and  are bonding appropriately  Outcome: Progressing  Goal: Establishment of infant feeding pattern  Description:  Postpartum OB-Pregnancy care plan goal which identifies if the mother is establishing a feeding pattern with their   Outcome: Progressing  Goal: Incisions, wounds, or drain sites healing without S/S of infection  Outcome: Progressing     Problem: Pain  Goal: Verbalizes/displays adequate comfort level or baseline comfort level  Outcome: Progressing     Problem: Infection - Adult  Goal: Absence of infection at discharge  Outcome: Progressing  Goal: Absence of infection during hospitalization  Outcome: Progressing  Goal: Absence of fever/infection during anticipated neutropenic period  Outcome: Progressing     Problem: Safety - Adult  Goal: Free from fall injury  Outcome: Progressing     Problem: Discharge Planning  Goal: Discharge to home or other facility with appropriate resources  Outcome: Progressing     Problem: Cardiovascular - Adult  Goal: Maintains optimal cardiac output and hemodynamic stability  Description: INTERVENTIONS:.  -Monitor blood pressure and heart rate  -Monitor urine output and notify licensed practitioner for values outside of   -Administer fluid and /or volume expanders as ordered  -Administer vasoactive or antifibrinolytic medications as

## 2025-07-25 ENCOUNTER — OFFICE VISIT (OUTPATIENT)
Dept: OBGYN | Age: 30
End: 2025-07-25

## 2025-07-25 VITALS
SYSTOLIC BLOOD PRESSURE: 124 MMHG | DIASTOLIC BLOOD PRESSURE: 78 MMHG | BODY MASS INDEX: 42.34 KG/M2 | HEART RATE: 80 BPM | WEIGHT: 248 LBS | HEIGHT: 64 IN

## 2025-07-25 DIAGNOSIS — Z09 POSTOP CHECK: Primary | ICD-10-CM

## 2025-07-25 ASSESSMENT — ENCOUNTER SYMPTOMS
ABDOMINAL PAIN: 0
GASTROINTESTINAL NEGATIVE: 1
RESPIRATORY NEGATIVE: 1
CONSTIPATION: 0
VOMITING: 0
DIARRHEA: 0
CHEST TIGHTNESS: 0
NAUSEA: 0

## 2025-07-25 NOTE — PROGRESS NOTES
25    Marlene Antunez  1995    Chief Complaint   Patient presents with    Post-Op Check     25 . Pt reports incision pain         Marlene Antunez is a 29 y.o. female who presents today for evaluation of see above.    Past Medical History:   Diagnosis Date    Dysmenorrhea     History of miscarriage     Irregular menses     Menorrhagia     Pregnant        Past Surgical History:   Procedure Laterality Date     SECTION N/A 2025     SECTION performed by Hilary Dominguez DO at [facility]    TONSILLECTOMY         Social History     Tobacco Use    Smoking status: Never    Smokeless tobacco: Never   Vaping Use    Vaping status: Never Used   Substance Use Topics    Alcohol use: Not Currently     Comment: occ    Drug use: Never       Family History   Problem Relation Age of Onset    Heart Disease Maternal Grandmother        Current Outpatient Medications   Medication Sig Dispense Refill    ibuprofen (ADVIL;MOTRIN) 800 MG tablet Take 1 tablet by mouth in the morning and 1 tablet at noon and 1 tablet in the evening. 120 tablet 3    ferrous sulfate (IRON 325) 325 (65 Fe) MG tablet Take 1 tablet by mouth every other day 30 tablet 3    docusate sodium (COLACE, DULCOLAX) 100 MG CAPS Take 100 mg by mouth 2 times daily 30 capsule 0     No current facility-administered medications for this visit.       Allergies   Allergen Reactions    Oatmeal            Immunization History   Administered Date(s) Administered    DTP 10/01/2010    DTaP vaccine 1998, 1998, 1998, 1999, 09/15/2004    HPV Quadrivalent (Gardasil) 10/01/2010    Hep B, ENGERIX-B, RECOMBIVAX-HB, (age Birth - 19y), IM, 0.5mL 1997, 1998, 1998    Hib vaccine 1998, 1998, 1998, 1999    Influenza Virus Vaccine 10/01/2010, 2019, 2019    Influenza, FLUARIX, FLULAVAL, FLUZONE (age 6 mo+) and AFLURIA, (age 3 y+), Quadv PF, 0.5mL 10/13/2020    Influenza,

## 2025-08-04 DIAGNOSIS — O92.5 SUPPRESSED LACTATION WITH INFANT BREAST ATTACHMENT DIFFICULTY: Primary | ICD-10-CM

## 2025-08-05 ENCOUNTER — HOSPITAL ENCOUNTER (OUTPATIENT)
Dept: LACTATION | Age: 30
Discharge: HOME | End: 2025-08-05

## 2025-08-29 ENCOUNTER — POSTPARTUM VISIT (OUTPATIENT)
Dept: OBGYN | Age: 30
End: 2025-08-29

## 2025-08-29 VITALS
HEIGHT: 64 IN | BODY MASS INDEX: 38.62 KG/M2 | WEIGHT: 226.2 LBS | HEART RATE: 75 BPM | SYSTOLIC BLOOD PRESSURE: 109 MMHG | DIASTOLIC BLOOD PRESSURE: 65 MMHG

## 2025-08-29 DIAGNOSIS — N94.6 DYSMENORRHEA: ICD-10-CM

## 2025-08-29 PROCEDURE — 0503F POSTPARTUM CARE VISIT: CPT

## 2025-08-29 RX ORDER — ACETAMINOPHEN AND CODEINE PHOSPHATE 120; 12 MG/5ML; MG/5ML
1 SOLUTION ORAL DAILY
Qty: 84 TABLET | Refills: 3 | Status: SHIPPED | OUTPATIENT
Start: 2025-08-29

## 2025-08-29 ASSESSMENT — ENCOUNTER SYMPTOMS
RESPIRATORY NEGATIVE: 1
CHEST TIGHTNESS: 0
NAUSEA: 0
CONSTIPATION: 0
VOMITING: 0
SHORTNESS OF BREATH: 0
DIARRHEA: 0
GASTROINTESTINAL NEGATIVE: 1
ABDOMINAL PAIN: 0

## (undated) DEVICE — GOWN,ECLIPSE,POLYRNF,BRTHSLV,L,30/CS: Brand: MEDLINE

## (undated) DEVICE — GLOVE SURG SZ 6 THK91MIL LTX FREE SYN POLYISOPRENE ANTI

## (undated) DEVICE — SENSOR PLSE OXMTR AD CBL L3FT ADH TRANSMISSIVE

## (undated) DEVICE — APPLICATOR MEDICATED 26 CC SOLUTION HI LT ORNG CHLORAPREP

## (undated) DEVICE — SUTURE MONOCRYL SZ 3-0 L27IN ABSRB UD L60MM KS STR REV CUT Y523H

## (undated) DEVICE — CLEANER,CAUTERY TIP,2X2",STERILE: Brand: MEDLINE

## (undated) DEVICE — SINGLE PORT MANIFOLD: Brand: NEPTUNE 2

## (undated) DEVICE — SUTURE MONOCRYL SZ 0 L36IN ABSRB VLT L48MM CTX 1/2 CIR Y398H

## (undated) DEVICE — BLOOD TRANSFER DEVICE: Brand: MEDLINE

## (undated) DEVICE — ELECTRODE ES AD CRDLSS PT RET REM POLYHESIVE

## (undated) DEVICE — GLOVE SURG SZ 65 THK91MIL LTX FREE SYN POLYISOPRENE

## (undated) DEVICE — LARGE, DISPOSABLE ALEXIS O C-SECTION PROTECTOR - RETRACTOR: Brand: ALEXIS ® O C-SECTION PROTECTOR - RETRACTOR

## (undated) DEVICE — DRAPE SHEET ULTRAGARD: Brand: MEDLINE

## (undated) DEVICE — SKIN PREP TRAY 4 COMPARTM TRAY: Brand: MEDLINE INDUSTRIES, INC.

## (undated) DEVICE — DRESSING TRNSPAR W8XL12IN FLM SURESITE 123

## (undated) DEVICE — SUTURE VICRYL SZ 3-0 L36IN ABSRB UD L36MM CT-1 1/2 CIR J944H

## (undated) DEVICE — BABY BLANKET KIT: Brand: MEDLINE INDUSTRIES, INC.

## (undated) DEVICE — Device

## (undated) DEVICE — MATTRESS MAXI AIR TRNSF SGL PT USE DCS 37 45 48 52 75

## (undated) DEVICE — SUTURE VICRYL SZ 0 L36IN ABSRB VLT L36MM CT-1 1/2 CIR J346H

## (undated) DEVICE — 3M™ STERI-STRIP™ COMPOUND BENZOIN TINCTURE 40 BAGS/CARTON 4 CARTONS/CASE C1544: Brand: 3M™ STERI-STRIP™

## (undated) DEVICE — GOWN,SIRUS,POLYRNF,BRTHSLV,LG,30/CS: Brand: MEDLINE

## (undated) DEVICE — CONTAINER SPEC 172OZ MP STOR

## (undated) DEVICE — STRIP SKIN CLSR W1XL5IN NYLON REINF CURAD STERIL

## (undated) DEVICE — STRIP,CLOSURE,WOUND,MEDI-STRIP,1/2X4: Brand: MEDLINE

## (undated) DEVICE — TOWEL,OR,DSP,ST,BLUE,STD,6/PK,12PK/CS: Brand: MEDLINE

## (undated) DEVICE — GARMENT,MEDLINE,DVT,INT,CALF,MED, GEN2: Brand: MEDLINE

## (undated) DEVICE — GAUZE,SPONGE,8"X4",12PLY,STERILE,LF,2/PK: Brand: MEDLINE

## (undated) DEVICE — PENCIL SMK EVAC 15FT BLADE ELECTRD ROCKER F/TELSCP

## (undated) DEVICE — 1LYRTR 16FR10ML 100%SILI SNAP: Brand: MEDLINE INDUSTRIES, INC.